# Patient Record
Sex: MALE | Race: WHITE | Employment: OTHER | URBAN - METROPOLITAN AREA
[De-identification: names, ages, dates, MRNs, and addresses within clinical notes are randomized per-mention and may not be internally consistent; named-entity substitution may affect disease eponyms.]

---

## 2017-01-01 ENCOUNTER — APPOINTMENT (OUTPATIENT)
Dept: GENERAL RADIOLOGY | Age: 72
DRG: 433 | End: 2017-01-01
Payer: MEDICARE

## 2017-01-01 ENCOUNTER — APPOINTMENT (OUTPATIENT)
Dept: ULTRASOUND IMAGING | Age: 72
DRG: 433 | End: 2017-01-01
Payer: MEDICARE

## 2017-01-01 ENCOUNTER — HOSPITAL ENCOUNTER (OUTPATIENT)
Dept: MRI IMAGING | Age: 72
Discharge: HOME | End: 2017-12-19
Payer: MEDICARE

## 2017-01-01 ENCOUNTER — APPOINTMENT (OUTPATIENT)
Dept: PHARMACY | Age: 72
End: 2017-01-01
Payer: MEDICARE

## 2017-01-01 ENCOUNTER — APPOINTMENT (OUTPATIENT)
Dept: CT IMAGING | Age: 72
DRG: 433 | End: 2017-01-01
Payer: MEDICARE

## 2017-01-01 ENCOUNTER — HOSPITAL ENCOUNTER (OUTPATIENT)
Dept: PHARMACY | Age: 72
Setting detail: THERAPIES SERIES
Discharge: HOME | End: 2017-10-27
Payer: MEDICARE

## 2017-01-01 ENCOUNTER — HOSPITAL ENCOUNTER (INPATIENT)
Age: 72
LOS: 3 days | Discharge: HOME | DRG: 433 | End: 2017-12-07
Attending: EMERGENCY MEDICINE | Admitting: INTERNAL MEDICINE
Payer: MEDICARE

## 2017-01-01 ENCOUNTER — HOSPITAL ENCOUNTER (OUTPATIENT)
Dept: CT IMAGING | Age: 72
Discharge: HOME | End: 2017-12-19
Payer: MEDICARE

## 2017-01-01 ENCOUNTER — HOSPITAL ENCOUNTER (OUTPATIENT)
Dept: GENERAL RADIOLOGY | Age: 72
Discharge: HOME | End: 2017-12-19
Payer: MEDICARE

## 2017-01-01 ENCOUNTER — OFFICE VISIT (OUTPATIENT)
Dept: GASTROENTEROLOGY | Age: 72
End: 2017-01-01

## 2017-01-01 ENCOUNTER — HOSPITAL ENCOUNTER (OUTPATIENT)
Dept: PHARMACY | Age: 72
Setting detail: THERAPIES SERIES
Discharge: HOME | End: 2017-12-15
Payer: MEDICARE

## 2017-01-01 VITALS
BODY MASS INDEX: 33.59 KG/M2 | DIASTOLIC BLOOD PRESSURE: 50 MMHG | WEIGHT: 214 LBS | OXYGEN SATURATION: 96 % | SYSTOLIC BLOOD PRESSURE: 110 MMHG | HEART RATE: 91 BPM | HEIGHT: 67 IN

## 2017-01-01 VITALS — HEART RATE: 64 BPM | SYSTOLIC BLOOD PRESSURE: 131 MMHG | DIASTOLIC BLOOD PRESSURE: 78 MMHG

## 2017-01-01 VITALS
RESPIRATION RATE: 15 BRPM | BODY MASS INDEX: 33.34 KG/M2 | DIASTOLIC BLOOD PRESSURE: 60 MMHG | HEART RATE: 73 BPM | WEIGHT: 220 LBS | HEIGHT: 68 IN | SYSTOLIC BLOOD PRESSURE: 95 MMHG | TEMPERATURE: 97.5 F | OXYGEN SATURATION: 97 %

## 2017-01-01 DIAGNOSIS — D61.818 PANCYTOPENIA (HCC): ICD-10-CM

## 2017-01-01 DIAGNOSIS — R16.0 LIVER MASS: ICD-10-CM

## 2017-01-01 DIAGNOSIS — I48.91 ATRIAL FIBRILLATION, UNSPECIFIED TYPE (HCC): ICD-10-CM

## 2017-01-01 DIAGNOSIS — K76.89 LIVER NODULE: Primary | ICD-10-CM

## 2017-01-01 DIAGNOSIS — R10.9 FLANK PAIN: ICD-10-CM

## 2017-01-01 DIAGNOSIS — K70.31 ASCITES DUE TO ALCOHOLIC CIRRHOSIS (HCC): ICD-10-CM

## 2017-01-01 DIAGNOSIS — R18.8 OTHER ASCITES: Primary | ICD-10-CM

## 2017-01-01 DIAGNOSIS — R07.9 CHEST PAIN, UNSPECIFIED TYPE: ICD-10-CM

## 2017-01-01 DIAGNOSIS — K74.69 OTHER CIRRHOSIS OF LIVER (HCC): ICD-10-CM

## 2017-01-01 DIAGNOSIS — R18.8 OTHER ASCITES: ICD-10-CM

## 2017-01-01 LAB
ABO/RH: NORMAL
ALBUMIN SERPL-MCNC: 3.3 G/DL (ref 3.9–4.9)
ALBUMIN SERPL-MCNC: 3.3 G/DL (ref 3.9–4.9)
ALBUMIN SERPL-MCNC: 3.6 G/DL
ALP BLD-CCNC: 43 U/L (ref 35–104)
ALP BLD-CCNC: 43 U/L (ref 35–104)
ALP BLD-CCNC: 68 U/L
ALT SERPL-CCNC: 8 U/L (ref 0–41)
ALT SERPL-CCNC: 8 U/L (ref 0–41)
ALT SERPL-CCNC: 9 U/L
AMMONIA: 93 UMOL/L (ref 16–60)
ANION GAP SERPL CALCULATED.3IONS-SCNC: 10 MEQ/L (ref 7–13)
ANION GAP SERPL CALCULATED.3IONS-SCNC: 14 MEQ/L (ref 7–13)
ANION GAP SERPL CALCULATED.3IONS-SCNC: 9 MMOL/L
ANISOCYTOSIS: ABNORMAL
ANTIBODY SCREEN: NORMAL
ANTITHROMBIN ACTIVITY: 52 % (ref 76–128)
ANTITHROMBIN ANTIGEN: 48 % (ref 82–136)
AST SERPL-CCNC: 22 U/L (ref 0–40)
AST SERPL-CCNC: 23 U/L (ref 0–40)
AST SERPL-CCNC: 26 U/L
BASOPHILS ABSOLUTE: 0 K/UL (ref 0–0.2)
BASOPHILS ABSOLUTE: ABNORMAL /ΜL
BASOPHILS RELATIVE PERCENT: 1.3 %
BASOPHILS RELATIVE PERCENT: ABNORMAL %
BILIRUB SERPL-MCNC: 1.8 MG/DL (ref 0–1.2)
BILIRUB SERPL-MCNC: 2 MG/DL (ref 0–1.2)
BILIRUB SERPL-MCNC: 2.8 MG/DL (ref 0.1–1.4)
BILIRUBIN URINE: ABNORMAL
BLOOD BANK DISPENSE STATUS: NORMAL
BLOOD BANK PRODUCT CODE: NORMAL
BLOOD CULTURE, ROUTINE: NORMAL
BLOOD, URINE: NEGATIVE
BPU ID: NORMAL
BUN BLDV-MCNC: 10 MG/DL (ref 8–23)
BUN BLDV-MCNC: 10 MG/DL (ref 8–23)
BUN BLDV-MCNC: ABNORMAL MG/DL
C-REACTIVE PROTEIN: 25.7 MG/L (ref 0–5)
CALCIUM SERPL-MCNC: 7.8 MG/DL (ref 8.6–10.2)
CALCIUM SERPL-MCNC: 8.1 MG/DL (ref 8.6–10.2)
CALCIUM SERPL-MCNC: 9 MG/DL
CASTS: NORMAL /LPF
CHLORIDE BLD-SCNC: 100 MEQ/L (ref 98–107)
CHLORIDE BLD-SCNC: 97 MMOL/L
CHLORIDE BLD-SCNC: 99 MEQ/L (ref 98–107)
CLARITY: CLEAR
CO2: 24 MEQ/L (ref 22–29)
CO2: 28 MEQ/L (ref 22–29)
CO2: ABNORMAL MMOL/L
COLOR: ABNORMAL
CREAT SERPL-MCNC: 0.46 MG/DL (ref 0.7–1.2)
CREAT SERPL-MCNC: 0.58 MG/DL (ref 0.7–1.2)
CREAT SERPL-MCNC: 0.67 MG/DL
CULTURE, BLOOD 2: NORMAL
DESCRIPTION BLOOD BANK: NORMAL
EKG ATRIAL RATE: 60 BPM
EKG Q-T INTERVAL: 408 MS
EKG QRS DURATION: 94 MS
EKG QTC CALCULATION (BAZETT): 459 MS
EKG R AXIS: 113 DEGREES
EKG T AXIS: 39 DEGREES
EKG VENTRICULAR RATE: 76 BPM
EOSINOPHILS ABSOLUTE: 0.1 K/UL (ref 0–0.7)
EOSINOPHILS ABSOLUTE: ABNORMAL /ΜL
EOSINOPHILS RELATIVE PERCENT: 3 %
EOSINOPHILS RELATIVE PERCENT: ABNORMAL %
EPITHELIAL CELLS, UA: NORMAL /HPF
GFR AFRICAN AMERICAN: >60
GFR AFRICAN AMERICAN: >60
GFR CALCULATED: >60
GFR NON-AFRICAN AMERICAN: >60
GFR NON-AFRICAN AMERICAN: >60
GLOBULIN: 2.3 G/DL (ref 2.3–3.5)
GLOBULIN: 2.4 G/DL (ref 2.3–3.5)
GLUCOSE BLD-MCNC: 100 MG/DL (ref 60–115)
GLUCOSE BLD-MCNC: 103 MG/DL (ref 60–115)
GLUCOSE BLD-MCNC: 110 MG/DL (ref 60–115)
GLUCOSE BLD-MCNC: 118 MG/DL (ref 60–115)
GLUCOSE BLD-MCNC: 119 MG/DL (ref 74–109)
GLUCOSE BLD-MCNC: 121 MG/DL
GLUCOSE BLD-MCNC: 136 MG/DL (ref 60–115)
GLUCOSE BLD-MCNC: 137 MG/DL (ref 60–115)
GLUCOSE BLD-MCNC: 171 MG/DL (ref 60–115)
GLUCOSE BLD-MCNC: 57 MG/DL (ref 60–115)
GLUCOSE BLD-MCNC: 69 MG/DL (ref 60–115)
GLUCOSE BLD-MCNC: 75 MG/DL (ref 60–115)
GLUCOSE BLD-MCNC: 84 MG/DL (ref 60–115)
GLUCOSE BLD-MCNC: 84 MG/DL (ref 74–109)
GLUCOSE BLD-MCNC: 91 MG/DL (ref 60–115)
GLUCOSE BLD-MCNC: 97 MG/DL (ref 60–115)
GLUCOSE URINE: NEGATIVE MG/DL
HCT VFR BLD CALC: 32.6 % (ref 42–52)
HCT VFR BLD CALC: 32.7 % (ref 42–52)
HCT VFR BLD CALC: 35.5 % (ref 41–53)
HEMOGLOBIN: 10.4 G/DL (ref 14–18)
HEMOGLOBIN: 10.5 G/DL (ref 14–18)
HEMOGLOBIN: 11.6 G/DL (ref 13.5–17.5)
HEPATITIS B SURFACE ANTIGEN INTERPRETATION: NORMAL
HEPATITIS C ANTIBODY INTERPRETATION: NORMAL
HEPATITIS C ANTIBODY INTERPRETATION: NORMAL
INR BLD: 2.7
INR BLD: 2.9
INR BLD: 3
INR BLD: 4.2
INR BLD: 6.9
KETONES, URINE: NEGATIVE MG/DL
LEUKOCYTE ESTERASE, URINE: ABNORMAL
LV EF: 53 %
LVEF MODALITY: NORMAL
LYMPHOCYTES ABSOLUTE: 0.5 K/UL (ref 1–4.8)
LYMPHOCYTES ABSOLUTE: ABNORMAL /ΜL
LYMPHOCYTES RELATIVE PERCENT: 14 %
LYMPHOCYTES RELATIVE PERCENT: ABNORMAL %
MCH RBC QN AUTO: 28.2 PG (ref 27–31.3)
MCH RBC QN AUTO: 28.4 PG (ref 27–31.3)
MCH RBC QN AUTO: 28.7 PG
MCHC RBC AUTO-ENTMCNC: 31.9 % (ref 33–37)
MCHC RBC AUTO-ENTMCNC: 32.2 % (ref 33–37)
MCHC RBC AUTO-ENTMCNC: 32.7 G/DL
MCV RBC AUTO: 87.9 FL
MCV RBC AUTO: 88.2 FL (ref 80–100)
MCV RBC AUTO: 88.3 FL (ref 80–100)
MICROCYTES: 0
MITOCHONDRIAL M2 AB, IGG: 8.3 UNITS (ref 0–20)
MONOCYTES ABSOLUTE: 0.1 K/UL (ref 0.2–0.8)
MONOCYTES ABSOLUTE: ABNORMAL /ΜL
MONOCYTES RELATIVE PERCENT: 2.9 %
MONOCYTES RELATIVE PERCENT: ABNORMAL %
MUCUS: PRESENT
NEUTROPHILS ABSOLUTE: 3 K/UL (ref 1.4–6.5)
NEUTROPHILS ABSOLUTE: ABNORMAL /ΜL
NEUTROPHILS RELATIVE PERCENT: 80 %
NEUTROPHILS RELATIVE PERCENT: ABNORMAL %
NITRITE, URINE: POSITIVE
OVALOCYTES: ABNORMAL
PDW BLD-RTO: 19 % (ref 11.5–14.5)
PDW BLD-RTO: 19.1 % (ref 11.5–14.5)
PERFORMED ON: ABNORMAL
PERFORMED ON: NORMAL
PH UA: 5.5 (ref 5–9)
PLATELET # BLD: 123 K/UL (ref 130–400)
PLATELET # BLD: 133 K/UL (ref 130–400)
PLATELET # BLD: 154 K/ΜL
PLATELET SLIDE REVIEW: ADEQUATE
PMV BLD AUTO: 10.1 FL
POIKILOCYTES: ABNORMAL
POLYCHROMASIA: ABNORMAL
POTASSIUM SERPL-SCNC: 4.2 MEQ/L (ref 3.5–5.1)
POTASSIUM SERPL-SCNC: 4.4 MMOL/L
POTASSIUM SERPL-SCNC: 4.5 MEQ/L (ref 3.5–5.1)
PROTEIN C FUNCTIONAL: 12 % (ref 83–168)
PROTEIN S, FUNCTIONAL: 55 % (ref 66–143)
PROTEIN UA: 30 MG/DL
PROTHROMBIN TIME: 32 SEC (ref 8.1–13.7)
PROTHROMBIN TIME: 73.4 SEC (ref 8.1–13.7)
PROTIME: 32.8 SECONDS
PROTIME: 35.1 SECONDS
PROTIME: 41.3 SECONDS
RBC # BLD: 3.69 M/UL (ref 4.7–6.1)
RBC # BLD: 3.71 M/UL (ref 4.7–6.1)
RBC # BLD: 4.04 10^6/ΜL
RBC UA: NORMAL /HPF (ref 0–2)
SMUDGE CELLS: 1
SODIUM BLD-SCNC: 133 MMOL/L
SODIUM BLD-SCNC: 137 MEQ/L (ref 132–144)
SODIUM BLD-SCNC: 138 MEQ/L (ref 132–144)
SPECIFIC GRAVITY UA: 1.03 (ref 1–1.03)
TOTAL PROTEIN: 5.6 G/DL (ref 6.4–8.1)
TOTAL PROTEIN: 5.7 G/DL (ref 6.4–8.1)
TOTAL PROTEIN: 6.4
URINE CULTURE, ROUTINE: NORMAL
URINE CULTURE, ROUTINE: NORMAL
URINE REFLEX TO CULTURE: YES
UROBILINOGEN, URINE: 2 E.U./DL
WBC # BLD: 3.7 K/UL (ref 4.8–10.8)
WBC # BLD: 3.9 K/UL (ref 4.8–10.8)
WBC # BLD: 4.5 10^3/ML
WBC UA: NORMAL /HPF (ref 0–5)

## 2017-01-01 PROCEDURE — 6360000004 HC RX CONTRAST MEDICATION: Performed by: INTERNAL MEDICINE

## 2017-01-01 PROCEDURE — 74177 CT ABD & PELVIS W/CONTRAST: CPT

## 2017-01-01 PROCEDURE — 6370000000 HC RX 637 (ALT 250 FOR IP): Performed by: INTERNAL MEDICINE

## 2017-01-01 PROCEDURE — 94664 DEMO&/EVAL PT USE INHALER: CPT

## 2017-01-01 PROCEDURE — G8427 DOCREV CUR MEDS BY ELIG CLIN: HCPCS | Performed by: PHYSICIAN ASSISTANT

## 2017-01-01 PROCEDURE — 71010 XR CHEST PORTABLE: CPT

## 2017-01-01 PROCEDURE — 85610 PROTHROMBIN TIME: CPT

## 2017-01-01 PROCEDURE — 1123F ACP DISCUSS/DSCN MKR DOCD: CPT | Performed by: PHYSICIAN ASSISTANT

## 2017-01-01 PROCEDURE — 71020 XR CHEST STANDARD TWO VW: CPT

## 2017-01-01 PROCEDURE — 93306 TTE W/DOPPLER COMPLETE: CPT

## 2017-01-01 PROCEDURE — 72100 X-RAY EXAM L-S SPINE 2/3 VWS: CPT

## 2017-01-01 PROCEDURE — 6360000002 HC RX W HCPCS: Performed by: INTERNAL MEDICINE

## 2017-01-01 PROCEDURE — 81001 URINALYSIS AUTO W/SCOPE: CPT

## 2017-01-01 PROCEDURE — 80053 COMPREHEN METABOLIC PANEL: CPT

## 2017-01-01 PROCEDURE — 99223 1ST HOSP IP/OBS HIGH 75: CPT | Performed by: INTERNAL MEDICINE

## 2017-01-01 PROCEDURE — 1210000000 HC MED SURG R&B

## 2017-01-01 PROCEDURE — 3017F COLORECTAL CA SCREEN DOC REV: CPT | Performed by: PHYSICIAN ASSISTANT

## 2017-01-01 PROCEDURE — 6360000002 HC RX W HCPCS: Performed by: EMERGENCY MEDICINE

## 2017-01-01 PROCEDURE — 74176 CT ABD & PELVIS W/O CONTRAST: CPT

## 2017-01-01 PROCEDURE — 36415 COLL VENOUS BLD VENIPUNCTURE: CPT

## 2017-01-01 PROCEDURE — 85300 ANTITHROMBIN III ACTIVITY: CPT

## 2017-01-01 PROCEDURE — 1111F DSCHRG MED/CURRENT MED MERGE: CPT | Performed by: PHYSICIAN ASSISTANT

## 2017-01-01 PROCEDURE — 76705 ECHO EXAM OF ABDOMEN: CPT

## 2017-01-01 PROCEDURE — 83516 IMMUNOASSAY NONANTIBODY: CPT

## 2017-01-01 PROCEDURE — G8417 CALC BMI ABV UP PARAM F/U: HCPCS | Performed by: PHYSICIAN ASSISTANT

## 2017-01-01 PROCEDURE — 87040 BLOOD CULTURE FOR BACTERIA: CPT

## 2017-01-01 PROCEDURE — 99211 OFF/OP EST MAY X REQ PHY/QHP: CPT | Performed by: PHARMACIST

## 2017-01-01 PROCEDURE — 99211 OFF/OP EST MAY X REQ PHY/QHP: CPT

## 2017-01-01 PROCEDURE — 4040F PNEUMOC VAC/ADMIN/RCVD: CPT | Performed by: PHYSICIAN ASSISTANT

## 2017-01-01 PROCEDURE — 85027 COMPLETE CBC AUTOMATED: CPT

## 2017-01-01 PROCEDURE — 99285 EMERGENCY DEPT VISIT HI MDM: CPT

## 2017-01-01 PROCEDURE — 2580000003 HC RX 258: Performed by: EMERGENCY MEDICINE

## 2017-01-01 PROCEDURE — P9017 PLASMA 1 DONOR FRZ W/IN 8 HR: HCPCS

## 2017-01-01 PROCEDURE — 87340 HEPATITIS B SURFACE AG IA: CPT

## 2017-01-01 PROCEDURE — A9577 INJ MULTIHANCE: HCPCS | Performed by: INTERNAL MEDICINE

## 2017-01-01 PROCEDURE — 87086 URINE CULTURE/COLONY COUNT: CPT

## 2017-01-01 PROCEDURE — 82140 ASSAY OF AMMONIA: CPT

## 2017-01-01 PROCEDURE — 74183 MRI ABD W/O CNTR FLWD CNTR: CPT

## 2017-01-01 PROCEDURE — G8484 FLU IMMUNIZE NO ADMIN: HCPCS | Performed by: PHYSICIAN ASSISTANT

## 2017-01-01 PROCEDURE — 86850 RBC ANTIBODY SCREEN: CPT

## 2017-01-01 PROCEDURE — 86803 HEPATITIS C AB TEST: CPT

## 2017-01-01 PROCEDURE — 85301 ANTITHROMBIN III ANTIGEN: CPT

## 2017-01-01 PROCEDURE — 36430 TRANSFUSION BLD/BLD COMPNT: CPT

## 2017-01-01 PROCEDURE — 85306 CLOT INHIBIT PROT S FREE: CPT

## 2017-01-01 PROCEDURE — 86900 BLOOD TYPING SEROLOGIC ABO: CPT

## 2017-01-01 PROCEDURE — 1036F TOBACCO NON-USER: CPT | Performed by: PHYSICIAN ASSISTANT

## 2017-01-01 PROCEDURE — 96374 THER/PROPH/DIAG INJ IV PUSH: CPT

## 2017-01-01 PROCEDURE — 85610 PROTHROMBIN TIME: CPT | Performed by: PHARMACIST

## 2017-01-01 PROCEDURE — 85025 COMPLETE CBC W/AUTO DIFF WBC: CPT

## 2017-01-01 PROCEDURE — 2580000003 HC RX 258: Performed by: INTERNAL MEDICINE

## 2017-01-01 PROCEDURE — 86140 C-REACTIVE PROTEIN: CPT

## 2017-01-01 PROCEDURE — 86901 BLOOD TYPING SEROLOGIC RH(D): CPT

## 2017-01-01 PROCEDURE — 99214 OFFICE O/P EST MOD 30 MIN: CPT | Performed by: PHYSICIAN ASSISTANT

## 2017-01-01 PROCEDURE — 93005 ELECTROCARDIOGRAM TRACING: CPT

## 2017-01-01 PROCEDURE — 85303 CLOT INHIBIT PROT C ACTIVITY: CPT

## 2017-01-01 RX ORDER — NEBIVOLOL 5 MG/1
10 TABLET ORAL DAILY
Status: DISCONTINUED | OUTPATIENT
Start: 2017-01-01 | End: 2017-01-01 | Stop reason: HOSPADM

## 2017-01-01 RX ORDER — CIPROFLOXACIN 500 MG/1
500 TABLET, FILM COATED ORAL 2 TIMES DAILY
Status: DISCONTINUED | OUTPATIENT
Start: 2017-01-01 | End: 2017-01-01

## 2017-01-01 RX ORDER — KETOROLAC TROMETHAMINE 30 MG/ML
30 INJECTION, SOLUTION INTRAMUSCULAR; INTRAVENOUS ONCE
Status: COMPLETED | OUTPATIENT
Start: 2017-01-01 | End: 2017-01-01

## 2017-01-01 RX ORDER — DEXTROSE MONOHYDRATE 25 G/50ML
12.5 INJECTION, SOLUTION INTRAVENOUS PRN
Status: DISCONTINUED | OUTPATIENT
Start: 2017-01-01 | End: 2017-01-01 | Stop reason: HOSPADM

## 2017-01-01 RX ORDER — AMOXICILLIN 250 MG/1
500 CAPSULE ORAL 3 TIMES DAILY
Qty: 10 CAPSULE | Refills: 0 | OUTPATIENT
Start: 2017-01-01 | End: 2017-01-01

## 2017-01-01 RX ORDER — TAMSULOSIN HYDROCHLORIDE 0.4 MG/1
0.4 CAPSULE ORAL
Status: DISCONTINUED | OUTPATIENT
Start: 2017-01-01 | End: 2017-01-01 | Stop reason: HOSPADM

## 2017-01-01 RX ORDER — BUMETANIDE 1 MG/1
1 TABLET ORAL
Status: DISCONTINUED | OUTPATIENT
Start: 2017-01-01 | End: 2017-01-01 | Stop reason: HOSPADM

## 2017-01-01 RX ORDER — ALBUTEROL SULFATE 90 UG/1
2 AEROSOL, METERED RESPIRATORY (INHALATION) EVERY 4 HOURS PRN
Status: DISCONTINUED | OUTPATIENT
Start: 2017-01-01 | End: 2017-01-01 | Stop reason: HOSPADM

## 2017-01-01 RX ORDER — ASPIRIN 81 MG/1
81 TABLET, CHEWABLE ORAL DAILY
Status: DISCONTINUED | OUTPATIENT
Start: 2017-01-01 | End: 2017-01-01 | Stop reason: HOSPADM

## 2017-01-01 RX ORDER — AMLODIPINE BESYLATE 5 MG/1
5 TABLET ORAL DAILY
Status: DISCONTINUED | OUTPATIENT
Start: 2017-01-01 | End: 2017-01-01 | Stop reason: HOSPADM

## 2017-01-01 RX ORDER — SPIRONOLACTONE 25 MG/1
25 TABLET ORAL DAILY
Refills: 2 | COMMUNITY
Start: 2017-01-01

## 2017-01-01 RX ORDER — DEXTROSE MONOHYDRATE 50 MG/ML
100 INJECTION, SOLUTION INTRAVENOUS PRN
Status: DISCONTINUED | OUTPATIENT
Start: 2017-01-01 | End: 2017-01-01 | Stop reason: HOSPADM

## 2017-01-01 RX ORDER — SIMVASTATIN 40 MG
40 TABLET ORAL NIGHTLY
Status: DISCONTINUED | OUTPATIENT
Start: 2017-01-01 | End: 2017-01-01 | Stop reason: HOSPADM

## 2017-01-01 RX ORDER — HYDROCODONE BITARTRATE AND ACETAMINOPHEN 5; 325 MG/1; MG/1
1 TABLET ORAL EVERY 4 HOURS PRN
Status: DISCONTINUED | OUTPATIENT
Start: 2017-01-01 | End: 2017-01-01 | Stop reason: HOSPADM

## 2017-01-01 RX ORDER — TRAZODONE HYDROCHLORIDE 150 MG/1
150 TABLET ORAL NIGHTLY
Status: DISCONTINUED | OUTPATIENT
Start: 2017-01-01 | End: 2017-01-01 | Stop reason: HOSPADM

## 2017-01-01 RX ORDER — GLIPIZIDE 5 MG/1
5 TABLET ORAL
Status: DISCONTINUED | OUTPATIENT
Start: 2017-01-01 | End: 2017-01-01 | Stop reason: HOSPADM

## 2017-01-01 RX ORDER — SODIUM CHLORIDE 0.9 % (FLUSH) 0.9 %
10 SYRINGE (ML) INJECTION PRN
Status: DISCONTINUED | OUTPATIENT
Start: 2017-01-01 | End: 2017-01-01 | Stop reason: HOSPADM

## 2017-01-01 RX ORDER — SODIUM CHLORIDE 0.9 % (FLUSH) 0.9 %
10 SYRINGE (ML) INJECTION
Status: DISPENSED | OUTPATIENT
Start: 2017-01-01 | End: 2017-01-01

## 2017-01-01 RX ORDER — SODIUM CHLORIDE 9 MG/ML
INJECTION, SOLUTION INTRAVENOUS CONTINUOUS
Status: DISCONTINUED | OUTPATIENT
Start: 2017-01-01 | End: 2017-01-01

## 2017-01-01 RX ORDER — FENTANYL CITRATE 50 UG/ML
50 INJECTION, SOLUTION INTRAMUSCULAR; INTRAVENOUS ONCE
Status: COMPLETED | OUTPATIENT
Start: 2017-01-01 | End: 2017-01-01

## 2017-01-01 RX ORDER — NICOTINE POLACRILEX 4 MG
15 LOZENGE BUCCAL PRN
Status: DISCONTINUED | OUTPATIENT
Start: 2017-01-01 | End: 2017-01-01 | Stop reason: HOSPADM

## 2017-01-01 RX ORDER — AMOXICILLIN 500 MG/1
500 CAPSULE ORAL EVERY 12 HOURS SCHEDULED
Status: DISCONTINUED | OUTPATIENT
Start: 2017-01-01 | End: 2017-01-01 | Stop reason: HOSPADM

## 2017-01-01 RX ORDER — SODIUM CHLORIDE 0.9 % (FLUSH) 0.9 %
10 SYRINGE (ML) INJECTION EVERY 12 HOURS SCHEDULED
Status: DISCONTINUED | OUTPATIENT
Start: 2017-01-01 | End: 2017-01-01 | Stop reason: HOSPADM

## 2017-01-01 RX ORDER — ACETAMINOPHEN 325 MG/1
650 TABLET ORAL EVERY 4 HOURS PRN
Status: DISCONTINUED | OUTPATIENT
Start: 2017-01-01 | End: 2017-01-01 | Stop reason: HOSPADM

## 2017-01-01 RX ORDER — PHYTONADIONE 10 MG/ML
5 INJECTION, EMULSION INTRAMUSCULAR; INTRAVENOUS; SUBCUTANEOUS ONCE
Status: COMPLETED | OUTPATIENT
Start: 2017-01-01 | End: 2017-01-01

## 2017-01-01 RX ORDER — ALBUTEROL SULFATE 90 UG/1
2 AEROSOL, METERED RESPIRATORY (INHALATION) PRN
Status: DISCONTINUED | OUTPATIENT
Start: 2017-01-01 | End: 2017-01-01

## 2017-01-01 RX ORDER — SODIUM CHLORIDE 0.9 % (FLUSH) 0.9 %
40 SYRINGE (ML) INJECTION PRN
Status: DISCONTINUED | OUTPATIENT
Start: 2017-01-01 | End: 2017-01-01 | Stop reason: HOSPADM

## 2017-01-01 RX ORDER — FENOFIBRATE 160 MG/1
160 TABLET ORAL
Status: DISCONTINUED | OUTPATIENT
Start: 2017-01-01 | End: 2017-01-01

## 2017-01-01 RX ORDER — 0.9 % SODIUM CHLORIDE 0.9 %
250 INTRAVENOUS SOLUTION INTRAVENOUS ONCE
Status: COMPLETED | OUTPATIENT
Start: 2017-01-01 | End: 2017-01-01

## 2017-01-01 RX ORDER — SIMVASTATIN 40 MG
40 TABLET ORAL NIGHTLY
Status: ON HOLD | COMMUNITY
End: 2017-01-01 | Stop reason: HOSPADM

## 2017-01-01 RX ORDER — ALBUTEROL SULFATE 90 UG/1
2 AEROSOL, METERED RESPIRATORY (INHALATION) PRN
COMMUNITY
Start: 2012-11-21

## 2017-01-01 RX ADMIN — ASPIRIN 81 MG 81 MG: 81 TABLET ORAL at 10:23

## 2017-01-01 RX ADMIN — TRAZODONE HYDROCHLORIDE 150 MG: 150 TABLET ORAL at 20:24

## 2017-01-01 RX ADMIN — METFORMIN HYDROCHLORIDE 1000 MG: 500 TABLET ORAL at 10:23

## 2017-01-01 RX ADMIN — HYDROCODONE BITARTRATE AND ACETAMINOPHEN 1 TABLET: 5; 325 TABLET ORAL at 05:46

## 2017-01-01 RX ADMIN — BUMETANIDE 1 MG: 1 TABLET ORAL at 16:52

## 2017-01-01 RX ADMIN — SODIUM CHLORIDE, PRESERVATIVE FREE 10 ML: 5 INJECTION INTRAVENOUS at 21:42

## 2017-01-01 RX ADMIN — HYDROCODONE BITARTRATE AND ACETAMINOPHEN 1 TABLET: 5; 325 TABLET ORAL at 17:03

## 2017-01-01 RX ADMIN — HYDROCODONE BITARTRATE AND ACETAMINOPHEN 1 TABLET: 5; 325 TABLET ORAL at 00:21

## 2017-01-01 RX ADMIN — CIPROFLOXACIN HYDROCHLORIDE 500 MG: 500 TABLET, FILM COATED ORAL at 17:43

## 2017-01-01 RX ADMIN — NEBIVOLOL HYDROCHLORIDE 10 MG: 5 TABLET ORAL at 08:43

## 2017-01-01 RX ADMIN — AMLODIPINE BESYLATE 5 MG: 5 TABLET ORAL at 08:43

## 2017-01-01 RX ADMIN — GLIPIZIDE 5 MG: 5 TABLET ORAL at 16:52

## 2017-01-01 RX ADMIN — CIPROFLOXACIN HYDROCHLORIDE 500 MG: 500 TABLET, FILM COATED ORAL at 09:40

## 2017-01-01 RX ADMIN — FENTANYL CITRATE 50 MCG: 50 INJECTION, SOLUTION INTRAMUSCULAR; INTRAVENOUS at 15:29

## 2017-01-01 RX ADMIN — SIMVASTATIN 40 MG: 40 TABLET, FILM COATED ORAL at 21:41

## 2017-01-01 RX ADMIN — ENOXAPARIN SODIUM 40 MG: 40 INJECTION, SOLUTION INTRAVENOUS; SUBCUTANEOUS at 17:30

## 2017-01-01 RX ADMIN — BUMETANIDE 1 MG: 1 TABLET ORAL at 21:30

## 2017-01-01 RX ADMIN — TAMSULOSIN HYDROCHLORIDE 0.4 MG: 0.4 CAPSULE ORAL at 17:44

## 2017-01-01 RX ADMIN — SODIUM CHLORIDE 250 ML: 9 INJECTION, SOLUTION INTRAVENOUS at 15:05

## 2017-01-01 RX ADMIN — NEBIVOLOL HYDROCHLORIDE 10 MG: 5 TABLET ORAL at 09:40

## 2017-01-01 RX ADMIN — HYDROCODONE BITARTRATE AND ACETAMINOPHEN 1 TABLET: 5; 325 TABLET ORAL at 05:12

## 2017-01-01 RX ADMIN — ASPIRIN 81 MG 81 MG: 81 TABLET ORAL at 09:41

## 2017-01-01 RX ADMIN — SIMVASTATIN 40 MG: 40 TABLET, FILM COATED ORAL at 21:31

## 2017-01-01 RX ADMIN — SODIUM CHLORIDE: 9 INJECTION, SOLUTION INTRAVENOUS at 17:30

## 2017-01-01 RX ADMIN — LINAGLIPTIN 5 MG: 5 TABLET, FILM COATED ORAL at 09:41

## 2017-01-01 RX ADMIN — METFORMIN HYDROCHLORIDE 1000 MG: 500 TABLET ORAL at 09:41

## 2017-01-01 RX ADMIN — SIMVASTATIN 40 MG: 40 TABLET, FILM COATED ORAL at 20:24

## 2017-01-01 RX ADMIN — METFORMIN HYDROCHLORIDE 1000 MG: 500 TABLET ORAL at 16:52

## 2017-01-01 RX ADMIN — IOPAMIDOL 100 ML: 612 INJECTION, SOLUTION INTRAVENOUS at 11:30

## 2017-01-01 RX ADMIN — SODIUM CHLORIDE, PRESERVATIVE FREE 10 ML: 5 INJECTION INTRAVENOUS at 09:41

## 2017-01-01 RX ADMIN — METFORMIN HYDROCHLORIDE 1000 MG: 500 TABLET ORAL at 17:43

## 2017-01-01 RX ADMIN — GLIPIZIDE 5 MG: 5 TABLET ORAL at 17:44

## 2017-01-01 RX ADMIN — ACETAMINOPHEN 650 MG: 325 TABLET, FILM COATED ORAL at 18:17

## 2017-01-01 RX ADMIN — KETOROLAC TROMETHAMINE 30 MG: 30 INJECTION, SOLUTION INTRAMUSCULAR at 13:17

## 2017-01-01 RX ADMIN — NEBIVOLOL HYDROCHLORIDE 10 MG: 5 TABLET ORAL at 10:23

## 2017-01-01 RX ADMIN — METFORMIN HYDROCHLORIDE 1000 MG: 500 TABLET ORAL at 08:43

## 2017-01-01 RX ADMIN — SODIUM CHLORIDE: 9 INJECTION, SOLUTION INTRAVENOUS at 04:14

## 2017-01-01 RX ADMIN — LINAGLIPTIN 5 MG: 5 TABLET, FILM COATED ORAL at 08:43

## 2017-01-01 RX ADMIN — HYDROCODONE BITARTRATE AND ACETAMINOPHEN 1 TABLET: 5; 325 TABLET ORAL at 21:41

## 2017-01-01 RX ADMIN — TRAZODONE HYDROCHLORIDE 150 MG: 150 TABLET ORAL at 21:41

## 2017-01-01 RX ADMIN — ASPIRIN 81 MG 81 MG: 81 TABLET ORAL at 08:43

## 2017-01-01 RX ADMIN — TAMSULOSIN HYDROCHLORIDE 0.4 MG: 0.4 CAPSULE ORAL at 16:52

## 2017-01-01 RX ADMIN — SODIUM CHLORIDE, PRESERVATIVE FREE 10 ML: 5 INJECTION INTRAVENOUS at 10:26

## 2017-01-01 RX ADMIN — AMLODIPINE BESYLATE 5 MG: 5 TABLET ORAL at 09:40

## 2017-01-01 RX ADMIN — GADOBENATE DIMEGLUMINE 21 ML: 529 INJECTION, SOLUTION INTRAVENOUS at 14:45

## 2017-01-01 RX ADMIN — PHYTONADIONE 5 MG: 10 INJECTION, EMULSION INTRAMUSCULAR; INTRAVENOUS; SUBCUTANEOUS at 08:43

## 2017-01-01 RX ADMIN — SODIUM CHLORIDE, PRESERVATIVE FREE 10 ML: 5 INJECTION INTRAVENOUS at 20:25

## 2017-01-01 RX ADMIN — LINAGLIPTIN 5 MG: 5 TABLET, FILM COATED ORAL at 10:23

## 2017-01-01 RX ADMIN — BUMETANIDE 1 MG: 1 TABLET ORAL at 17:43

## 2017-01-01 RX ADMIN — CIPROFLOXACIN HYDROCHLORIDE 500 MG: 500 TABLET, FILM COATED ORAL at 10:23

## 2017-01-01 RX ADMIN — SODIUM CHLORIDE: 9 INJECTION, SOLUTION INTRAVENOUS at 13:17

## 2017-01-01 RX ADMIN — ACETAMINOPHEN 650 MG: 325 TABLET, FILM COATED ORAL at 08:42

## 2017-01-01 RX ADMIN — TAMSULOSIN HYDROCHLORIDE 0.4 MG: 0.4 CAPSULE ORAL at 21:31

## 2017-01-01 RX ADMIN — TRAZODONE HYDROCHLORIDE 150 MG: 150 TABLET ORAL at 21:31

## 2017-01-01 RX ADMIN — CIPROFLOXACIN HYDROCHLORIDE 500 MG: 500 TABLET, FILM COATED ORAL at 20:24

## 2017-01-01 RX ADMIN — AMLODIPINE BESYLATE 5 MG: 5 TABLET ORAL at 10:23

## 2017-01-01 ASSESSMENT — ENCOUNTER SYMPTOMS
COLOR CHANGE: 0
ANAL BLEEDING: 0
APNEA: 0
NAUSEA: 0
VOMITING: 0
ABDOMINAL PAIN: 0
FACIAL SWELLING: 0
WHEEZING: 0
COUGH: 0
VOMITING: 0
COLOR CHANGE: 0
NAUSEA: 0
COUGH: 0
EYE DISCHARGE: 0
SHORTNESS OF BREATH: 0
ABDOMINAL PAIN: 0
BLOOD IN STOOL: 0
VOMITING: 0
SHORTNESS OF BREATH: 0
RECTAL PAIN: 0
ABDOMINAL DISTENTION: 1
BACK PAIN: 1
RHINORRHEA: 0
CHEST TIGHTNESS: 0
DIARRHEA: 0
DIARRHEA: 0
CONSTIPATION: 1
SHORTNESS OF BREATH: 0

## 2017-01-01 ASSESSMENT — PAIN SCALES - GENERAL
PAINLEVEL_OUTOF10: 4
PAINLEVEL_OUTOF10: 2
PAINLEVEL_OUTOF10: 10
PAINLEVEL_OUTOF10: 8
PAINLEVEL_OUTOF10: 0
PAINLEVEL_OUTOF10: 6
PAINLEVEL_OUTOF10: 6
PAINLEVEL_OUTOF10: 4
PAINLEVEL_OUTOF10: 6
PAINLEVEL_OUTOF10: 5
PAINLEVEL_OUTOF10: 1
PAINLEVEL_OUTOF10: 8
PAINLEVEL_OUTOF10: 7
PAINLEVEL_OUTOF10: 2

## 2017-01-01 ASSESSMENT — PAIN DESCRIPTION - PAIN TYPE: TYPE: ACUTE PAIN

## 2017-01-01 ASSESSMENT — PAIN DESCRIPTION - LOCATION
LOCATION: BACK
LOCATION: BACK

## 2017-01-01 ASSESSMENT — PAIN DESCRIPTION - ORIENTATION: ORIENTATION: LOWER;RIGHT

## 2017-01-01 ASSESSMENT — PAIN DESCRIPTION - DESCRIPTORS
DESCRIPTORS: ACHING
DESCRIPTORS: ACHING

## 2017-01-20 ENCOUNTER — HOSPITAL ENCOUNTER (OUTPATIENT)
Dept: PHARMACY | Age: 72
Discharge: HOME | End: 2017-01-20
Payer: MEDICARE

## 2017-01-20 DIAGNOSIS — I48.91 ATRIAL FIBRILLATION, UNSPECIFIED TYPE (HCC): ICD-10-CM

## 2017-01-20 LAB
INR BLD: 2.9
PROTIME: 34.2 SECONDS

## 2017-01-20 PROCEDURE — G0463 HOSPITAL OUTPT CLINIC VISIT: HCPCS

## 2017-01-20 PROCEDURE — 85610 PROTHROMBIN TIME: CPT

## 2017-02-17 ENCOUNTER — HOSPITAL ENCOUNTER (OUTPATIENT)
Dept: PHARMACY | Age: 72
Discharge: HOME | End: 2017-02-17
Payer: MEDICARE

## 2017-02-17 DIAGNOSIS — I48.91 ATRIAL FIBRILLATION, UNSPECIFIED TYPE (HCC): ICD-10-CM

## 2017-02-17 LAB
INR BLD: 3
PROTIME: 36.3 SECONDS

## 2017-02-17 PROCEDURE — G0463 HOSPITAL OUTPT CLINIC VISIT: HCPCS

## 2017-02-17 PROCEDURE — 85610 PROTHROMBIN TIME: CPT

## 2017-03-29 ENCOUNTER — HOSPITAL ENCOUNTER (OUTPATIENT)
Dept: PHARMACY | Age: 72
Discharge: HOME | End: 2017-03-29
Payer: MEDICARE

## 2017-03-29 DIAGNOSIS — I48.91 ATRIAL FIBRILLATION, UNSPECIFIED TYPE (HCC): ICD-10-CM

## 2017-03-29 LAB
INR BLD: 3.2
PROTIME: 38.7 SECONDS

## 2017-03-29 PROCEDURE — G0463 HOSPITAL OUTPT CLINIC VISIT: HCPCS | Performed by: PHARMACIST

## 2017-03-29 PROCEDURE — 85610 PROTHROMBIN TIME: CPT | Performed by: PHARMACIST

## 2017-04-21 ENCOUNTER — HOSPITAL ENCOUNTER (OUTPATIENT)
Dept: PHARMACY | Age: 72
Discharge: HOME | End: 2017-04-21
Payer: MEDICARE

## 2017-04-21 DIAGNOSIS — I48.91 ATRIAL FIBRILLATION, UNSPECIFIED TYPE (HCC): ICD-10-CM

## 2017-04-21 LAB
INR BLD: 3.6
PROTIME: 43.5 SECONDS

## 2017-04-21 PROCEDURE — 85610 PROTHROMBIN TIME: CPT

## 2017-04-21 PROCEDURE — G0463 HOSPITAL OUTPT CLINIC VISIT: HCPCS

## 2017-04-24 RX ORDER — SODIUM CHLORIDE 0.9 % (FLUSH) 0.9 %
10 SYRINGE (ML) INJECTION PRN
Status: CANCELLED | OUTPATIENT
Start: 2017-04-24

## 2017-04-24 RX ORDER — SODIUM CHLORIDE 0.9 % (FLUSH) 0.9 %
10 SYRINGE (ML) INJECTION EVERY 12 HOURS SCHEDULED
Status: CANCELLED | OUTPATIENT
Start: 2017-04-24

## 2017-04-24 RX ORDER — WARFARIN SODIUM 5 MG/1
TABLET ORAL
Qty: 60 TABLET | Refills: 1 | Status: SHIPPED | OUTPATIENT
Start: 2017-04-24 | End: 2018-01-01 | Stop reason: SDUPTHER

## 2017-04-25 ENCOUNTER — HOSPITAL ENCOUNTER (OUTPATIENT)
Age: 72
Setting detail: OUTPATIENT SURGERY
Discharge: HOME | End: 2017-04-25
Attending: INTERNAL MEDICINE | Admitting: INTERNAL MEDICINE
Payer: MEDICARE

## 2017-04-25 ENCOUNTER — ANESTHESIA EVENT (OUTPATIENT)
Dept: OPERATING ROOM | Age: 72
End: 2017-04-25
Payer: MEDICARE

## 2017-04-25 ENCOUNTER — ANESTHESIA (OUTPATIENT)
Dept: OPERATING ROOM | Age: 72
End: 2017-04-25
Payer: MEDICARE

## 2017-04-25 VITALS
RESPIRATION RATE: 16 BRPM | BODY MASS INDEX: 33.34 KG/M2 | HEART RATE: 84 BPM | WEIGHT: 220 LBS | SYSTOLIC BLOOD PRESSURE: 132 MMHG | OXYGEN SATURATION: 96 % | TEMPERATURE: 98.6 F | DIASTOLIC BLOOD PRESSURE: 60 MMHG | HEIGHT: 68 IN

## 2017-04-25 VITALS
RESPIRATION RATE: 21 BRPM | DIASTOLIC BLOOD PRESSURE: 65 MMHG | SYSTOLIC BLOOD PRESSURE: 132 MMHG | OXYGEN SATURATION: 87 %

## 2017-04-25 PROBLEM — R13.10 DYSPHAGIA: Status: ACTIVE | Noted: 2017-04-25

## 2017-04-25 LAB
GLUCOSE BLD-MCNC: 241 MG/DL (ref 60–115)
PERFORMED ON: ABNORMAL

## 2017-04-25 PROCEDURE — 2580000003 HC RX 258

## 2017-04-25 PROCEDURE — 3700000001 HC ADD 15 MINUTES (ANESTHESIA): Performed by: INTERNAL MEDICINE

## 2017-04-25 PROCEDURE — 3609012400 HC EGD TRANSORAL BIOPSY SINGLE/MULTIPLE: Performed by: INTERNAL MEDICINE

## 2017-04-25 PROCEDURE — 7100000011 HC PHASE II RECOVERY - ADDTL 15 MIN: Performed by: INTERNAL MEDICINE

## 2017-04-25 PROCEDURE — 3700000000 HC ANESTHESIA ATTENDED CARE: Performed by: INTERNAL MEDICINE

## 2017-04-25 PROCEDURE — 7100000010 HC PHASE II RECOVERY - FIRST 15 MIN: Performed by: INTERNAL MEDICINE

## 2017-04-25 PROCEDURE — 6360000002 HC RX W HCPCS: Performed by: NURSE ANESTHETIST, CERTIFIED REGISTERED

## 2017-04-25 PROCEDURE — 2500000003 HC RX 250 WO HCPCS

## 2017-04-25 PROCEDURE — 2580000003 HC RX 258: Performed by: INTERNAL MEDICINE

## 2017-04-25 PROCEDURE — 88305 TISSUE EXAM BY PATHOLOGIST: CPT

## 2017-04-25 RX ORDER — SODIUM CHLORIDE, SODIUM LACTATE, POTASSIUM CHLORIDE, CALCIUM CHLORIDE 600; 310; 30; 20 MG/100ML; MG/100ML; MG/100ML; MG/100ML
INJECTION, SOLUTION INTRAVENOUS
Status: COMPLETED
Start: 2017-04-25 | End: 2017-04-25

## 2017-04-25 RX ORDER — PROPOFOL 10 MG/ML
INJECTION, EMULSION INTRAVENOUS PRN
Status: DISCONTINUED | OUTPATIENT
Start: 2017-04-25 | End: 2017-04-25 | Stop reason: SDUPTHER

## 2017-04-25 RX ORDER — LIDOCAINE HYDROCHLORIDE 10 MG/ML
INJECTION, SOLUTION EPIDURAL; INFILTRATION; INTRACAUDAL; PERINEURAL
Status: COMPLETED
Start: 2017-04-25 | End: 2017-04-25

## 2017-04-25 RX ORDER — MAGNESIUM HYDROXIDE 1200 MG/15ML
LIQUID ORAL PRN
Status: DISCONTINUED | OUTPATIENT
Start: 2017-04-25 | End: 2017-04-25 | Stop reason: HOSPADM

## 2017-04-25 RX ORDER — ONDANSETRON 2 MG/ML
4 INJECTION INTRAMUSCULAR; INTRAVENOUS
Status: DISCONTINUED | OUTPATIENT
Start: 2017-04-25 | End: 2017-04-25 | Stop reason: HOSPADM

## 2017-04-25 RX ORDER — SODIUM CHLORIDE, SODIUM LACTATE, POTASSIUM CHLORIDE, CALCIUM CHLORIDE 600; 310; 30; 20 MG/100ML; MG/100ML; MG/100ML; MG/100ML
INJECTION, SOLUTION INTRAVENOUS CONTINUOUS
Status: DISCONTINUED | OUTPATIENT
Start: 2017-04-25 | End: 2017-04-25 | Stop reason: HOSPADM

## 2017-04-25 RX ORDER — LIDOCAINE HYDROCHLORIDE 10 MG/ML
1 INJECTION, SOLUTION EPIDURAL; INFILTRATION; INTRACAUDAL; PERINEURAL
Status: COMPLETED | OUTPATIENT
Start: 2017-04-25 | End: 2017-04-25

## 2017-04-25 RX ADMIN — LIDOCAINE HYDROCHLORIDE 0.1 ML: 10 INJECTION, SOLUTION EPIDURAL; INFILTRATION; INTRACAUDAL; PERINEURAL at 10:47

## 2017-04-25 RX ADMIN — PROPOFOL 200 MG: 10 INJECTION, EMULSION INTRAVENOUS at 14:47

## 2017-04-25 RX ADMIN — SODIUM CHLORIDE, SODIUM LACTATE, POTASSIUM CHLORIDE, CALCIUM CHLORIDE: 600; 310; 30; 20 INJECTION, SOLUTION INTRAVENOUS at 10:48

## 2017-04-25 RX ADMIN — SODIUM CHLORIDE, POTASSIUM CHLORIDE, SODIUM LACTATE AND CALCIUM CHLORIDE: 600; 310; 30; 20 INJECTION, SOLUTION INTRAVENOUS at 10:48

## 2017-04-25 ASSESSMENT — ENCOUNTER SYMPTOMS: STRIDOR: 0

## 2017-04-25 ASSESSMENT — PAIN - FUNCTIONAL ASSESSMENT: PAIN_FUNCTIONAL_ASSESSMENT: 0-10

## 2017-05-05 ENCOUNTER — HOSPITAL ENCOUNTER (OUTPATIENT)
Dept: PHARMACY | Age: 72
Discharge: HOME | End: 2017-05-05
Payer: MEDICARE

## 2017-05-05 DIAGNOSIS — I48.91 ATRIAL FIBRILLATION, UNSPECIFIED TYPE (HCC): ICD-10-CM

## 2017-05-05 LAB
INR BLD: 4.2
PROTIME: 50.5 SECONDS

## 2017-05-05 PROCEDURE — 85610 PROTHROMBIN TIME: CPT

## 2017-05-05 PROCEDURE — 99211 OFF/OP EST MAY X REQ PHY/QHP: CPT

## 2017-05-19 ENCOUNTER — HOSPITAL ENCOUNTER (OUTPATIENT)
Dept: PHARMACY | Age: 72
Discharge: HOME | End: 2017-05-19
Payer: MEDICARE

## 2017-05-19 DIAGNOSIS — I48.91 ATRIAL FIBRILLATION, UNSPECIFIED TYPE (HCC): ICD-10-CM

## 2017-05-19 LAB
INR BLD: 3.7
PROTIME: 44.7 SECONDS

## 2017-05-19 PROCEDURE — 99211 OFF/OP EST MAY X REQ PHY/QHP: CPT

## 2017-05-19 PROCEDURE — 85610 PROTHROMBIN TIME: CPT

## 2017-06-02 ENCOUNTER — HOSPITAL ENCOUNTER (OUTPATIENT)
Dept: PHARMACY | Age: 72
Discharge: HOME | End: 2017-06-02
Payer: MEDICARE

## 2017-06-02 DIAGNOSIS — I48.91 ATRIAL FIBRILLATION, UNSPECIFIED TYPE (HCC): ICD-10-CM

## 2017-06-02 LAB
INR BLD: 2.5
PROTIME: 30.5 SECONDS

## 2017-06-02 PROCEDURE — 99211 OFF/OP EST MAY X REQ PHY/QHP: CPT

## 2017-06-02 PROCEDURE — 85610 PROTHROMBIN TIME: CPT

## 2017-06-16 ENCOUNTER — HOSPITAL ENCOUNTER (OUTPATIENT)
Dept: PHARMACY | Age: 72
Setting detail: THERAPIES SERIES
Discharge: HOME | End: 2017-06-16
Payer: MEDICARE

## 2017-06-16 DIAGNOSIS — I48.91 ATRIAL FIBRILLATION, UNSPECIFIED TYPE (HCC): ICD-10-CM

## 2017-06-16 LAB
INR BLD: 2.8
PROTIME: 33.1 SECONDS

## 2017-06-16 PROCEDURE — 99211 OFF/OP EST MAY X REQ PHY/QHP: CPT

## 2017-06-16 PROCEDURE — 85610 PROTHROMBIN TIME: CPT

## 2017-07-14 ENCOUNTER — HOSPITAL ENCOUNTER (OUTPATIENT)
Dept: PHARMACY | Age: 72
Setting detail: THERAPIES SERIES
Discharge: HOME | End: 2017-07-14
Payer: MEDICARE

## 2017-07-14 DIAGNOSIS — I48.91 ATRIAL FIBRILLATION, UNSPECIFIED TYPE (HCC): ICD-10-CM

## 2017-07-14 LAB
INR BLD: 2.1
PROTIME: 25.8 SECONDS

## 2017-07-14 PROCEDURE — 85610 PROTHROMBIN TIME: CPT | Performed by: PHARMACIST

## 2017-07-14 PROCEDURE — 99211 OFF/OP EST MAY X REQ PHY/QHP: CPT | Performed by: PHARMACIST

## 2017-08-11 ENCOUNTER — HOSPITAL ENCOUNTER (OUTPATIENT)
Dept: PHARMACY | Age: 72
Setting detail: THERAPIES SERIES
Discharge: HOME | End: 2017-08-11
Payer: MEDICARE

## 2017-08-11 DIAGNOSIS — I48.91 ATRIAL FIBRILLATION, UNSPECIFIED TYPE (HCC): ICD-10-CM

## 2017-08-11 LAB
INR BLD: 2.5
PROTIME: 30.5 SECONDS

## 2017-08-11 PROCEDURE — 99211 OFF/OP EST MAY X REQ PHY/QHP: CPT

## 2017-08-11 PROCEDURE — 85610 PROTHROMBIN TIME: CPT

## 2017-09-07 ENCOUNTER — HOSPITAL ENCOUNTER (OUTPATIENT)
Dept: CT IMAGING | Age: 72
Discharge: HOME | End: 2017-09-07
Payer: MEDICARE

## 2017-09-07 VITALS
HEIGHT: 67 IN | HEART RATE: 82 BPM | DIASTOLIC BLOOD PRESSURE: 80 MMHG | SYSTOLIC BLOOD PRESSURE: 129 MMHG | WEIGHT: 220 LBS | RESPIRATION RATE: 16 BRPM | BODY MASS INDEX: 34.53 KG/M2

## 2017-09-07 DIAGNOSIS — R07.9 CHEST PAIN, UNSPECIFIED TYPE: ICD-10-CM

## 2017-09-07 DIAGNOSIS — R10.9 ABDOMINAL PAIN, UNSPECIFIED SITE: ICD-10-CM

## 2017-09-07 LAB
GFR AFRICAN AMERICAN: >60
GFR NON-AFRICAN AMERICAN: >60
PERFORMED ON: ABNORMAL
POC CREATININE: 0.6 MG/DL (ref 0.8–1.3)
POC SAMPLE TYPE: ABNORMAL

## 2017-09-07 PROCEDURE — 2500000003 HC RX 250 WO HCPCS: Performed by: RADIOLOGY

## 2017-09-07 PROCEDURE — 6360000004 HC RX CONTRAST MEDICATION: Performed by: RADIOLOGY

## 2017-09-07 PROCEDURE — 2580000003 HC RX 258: Performed by: RADIOLOGY

## 2017-09-07 PROCEDURE — 74160 CT ABDOMEN W/CONTRAST: CPT

## 2017-09-07 PROCEDURE — 71260 CT THORAX DX C+: CPT

## 2017-09-07 RX ORDER — SODIUM CHLORIDE 0.9 % (FLUSH) 0.9 %
10 SYRINGE (ML) INJECTION ONCE
Status: COMPLETED | OUTPATIENT
Start: 2017-09-07 | End: 2017-09-07

## 2017-09-07 RX ADMIN — SODIUM CHLORIDE, PRESERVATIVE FREE 10 ML: 5 INJECTION INTRAVENOUS at 11:02

## 2017-09-07 RX ADMIN — IOPAMIDOL 100 ML: 755 INJECTION, SOLUTION INTRAVENOUS at 11:02

## 2017-09-07 RX ADMIN — BARIUM SULFATE 450 ML: 21 SUSPENSION ORAL at 11:01

## 2017-09-15 ENCOUNTER — HOSPITAL ENCOUNTER (OUTPATIENT)
Dept: PHARMACY | Age: 72
Setting detail: THERAPIES SERIES
Discharge: HOME | End: 2017-09-15
Payer: MEDICARE

## 2017-09-15 DIAGNOSIS — I48.91 ATRIAL FIBRILLATION, UNSPECIFIED TYPE (HCC): ICD-10-CM

## 2017-09-15 LAB
INR BLD: 2.4
PROTIME: 28.6 SECONDS

## 2017-09-15 PROCEDURE — 85610 PROTHROMBIN TIME: CPT | Performed by: PHARMACIST

## 2017-09-15 PROCEDURE — 99211 OFF/OP EST MAY X REQ PHY/QHP: CPT | Performed by: PHARMACIST

## 2017-10-27 NOTE — PROGRESS NOTES
Mr. Joellen Nolasco is a 74 y.o. y/o male with history of Afib who presents today for anticoagulation monitoring and adjustment.   INR 2.7 is therapeutic for this patient (goal range 2-3) and is reflective of 17.5 mg TWD  Patient verifies current dosing regimen, patient able to verbally recall dose  Patient reports no missed doses since last INR   Patient denies s/sx clotting and/or stroke  Patient denies hematuria, epistaxis, rectal bleeding  Patient denies changes in diet, alcohol, or tobacco use  Reviewed medication list and drug allergies with patient, updated any medication additions or modifications accordingly  Patient also denies any pending medical or dental procedures scheduled at this time  Patient was instructed to continue 17.5 mg TWD and RTC 6 weeks

## 2017-12-04 PROBLEM — R18.8 ASCITES: Status: ACTIVE | Noted: 2017-01-01

## 2017-12-04 NOTE — ED NOTES
Report to RYAN Sanchez r/t Pt status with admit to 475.   Pt and family aware of plan of care with admit     Tushar Warner RN  12/04/17 4707

## 2017-12-04 NOTE — PROGRESS NOTES
Admission assessment complete and documented. Meds verbally verified with pt and wife. Pt has pain 2/10 and declines wanting intervention at this time. Pt is up with assistance and falls precautions initiated. No complaints at this time. Skin intact. Will monitor. Call light in reach.  Electronically signed by Dayanara Aparicio RN on 12/4/2017 at 5:09 PM

## 2017-12-04 NOTE — ED TRIAGE NOTES
Pt c/o back pain since Saturday morning, denies any injury or lifting. Pt had kidney infection the week before, last dose of ATB was Saturday. Back pain in middle of back on Saturday, today pain is middle and right sided.   Nothing taken for pain

## 2017-12-04 NOTE — ED PROVIDER NOTES
History reviewed. No pertinent family history. SOCIAL HISTORY       Social History     Social History    Marital status:      Spouse name: N/A    Number of children: N/A    Years of education: N/A     Social History Main Topics    Smoking status: Former Smoker     Types: Cigars    Smokeless tobacco: Never Used    Alcohol use No    Drug use: No    Sexual activity: Not Asked     Other Topics Concern    None     Social History Narrative    None       SCREENINGS             PHYSICAL EXAM    (up to 7 for level 4, 8 or more for level 5)     ED Triage Vitals   BP Temp Temp src Pulse Resp SpO2 Height Weight   12/04/17 1118 12/04/17 1118 -- 12/04/17 1119 12/04/17 1118 12/04/17 1119 12/04/17 1118 12/04/17 1118   116/79 97.9 °F (36.6 °C)  87 20 95 % 5' 8\" (1.727 m) 220 lb (99.8 kg)       Physical Exam   Constitutional: He is oriented to person, place, and time. He appears well-developed and well-nourished. HENT:   Head: Normocephalic and atraumatic. Eyes: Conjunctivae and EOM are normal. Pupils are equal, round, and reactive to light. Neck: Normal range of motion. Neck supple. Cardiovascular: Normal rate. Pulmonary/Chest: Effort normal.   Abdominal: Soft. Bowel sounds are normal. He exhibits no distension. There is no tenderness. Musculoskeletal: Normal range of motion. No CVA tenderness bilaterally. Patient does have paraspinous muscle spasming and increased tissue tension abnormality in the midthoracic region. Neurological: He is alert and oriented to person, place, and time. He has normal reflexes. Skin: Skin is warm and dry. Psychiatric: He has a normal mood and affect.        DIAGNOSTIC RESULTS     EKG: All EKG's are interpreted by the Emergency Department Physician who either signs or Co-signs this chart in the absence of a cardiologist.        RADIOLOGY:   Non-plain film images such as CT, Ultrasound and MRI are read by the radiologist. Plain radiographic images are visualized and preliminarily interpreted by the emergency physician with the below findings:    Patient has possible effusion at left base per my read    Interpretation per the Radiologist below, if available at the time of this note:    CT ABDOMEN PELVIS WO IV CONTRAST Additional Contrast? None   Final Result   CIRRHOSIS WITH ASCITES AND SPLENOMEGALY. THERE ARE SMALL INTRARENAL CALCULI. THERE IS NO CALCULUS WITHIN EITHER RENAL PELVIS AND THERE ARE NO CALCULI ALONG THE COURSE OF EITHER URETER. All CT scans at this facility use dose modulation, iterative reconstruction, and/or weight based dosing when appropriate to reduce radiation dose to as low as reasonably achievable. XR Chest Portable    (Results Pending)         ED BEDSIDE ULTRASOUND:   Performed by ED Physician - none    LABS:  Labs Reviewed   URINE RT REFLEX TO CULTURE - Abnormal; Notable for the following:        Result Value    Color, UA ORANGE (*)     Bilirubin Urine SMALL (*)     Protein, UA 30 (*)     Urobilinogen, Urine 2.0 (*)     Nitrite, Urine POSITIVE (*)     Leukocyte Esterase, Urine SMALL (*)     All other components within normal limits   CBC - Abnormal; Notable for the following:     WBC 3.9 (*)     RBC 3.71 (*)     Hemoglobin 10.5 (*)     Hematocrit 32.7 (*)     MCHC 32.2 (*)     RDW 19.1 (*)     Platelets 534 (*)     All other components within normal limits   COMPREHENSIVE METABOLIC PANEL - Abnormal; Notable for the following:     Glucose 119 (*)     CREATININE 0.58 (*)     Calcium 8.1 (*)     Total Protein 5.6 (*)     Alb 3.3 (*)     Total Bilirubin 1.8 (*)     All other components within normal limits   URINE CULTURE   CULTURE BLOOD #1   CULTURE BLOOD #2   MICROSCOPIC URINALYSIS       All other labs were within normal range or not returned as of this dictation.     EMERGENCY DEPARTMENT COURSE and DIFFERENTIAL DIAGNOSIS/MDM:   Vitals:    Vitals:    12/04/17 1118 12/04/17 1119 12/04/17 1352   BP: 116/79  119/77   Pulse:  87 80

## 2017-12-05 NOTE — PLAN OF CARE
Problem: Discharge Planning:  Goal: Patients continuum of care needs are met  Patients continuum of care needs are met   Outcome: Ongoing  CM and SW following with potential DC needs. Pt plans to DC home with wife.

## 2017-12-05 NOTE — PROGRESS NOTES
[facility] EMERGENCY [facility] AT [place] Respiratory Therapy Evaluation   Current Order:  ALBUTEROL 2 PUFFS PRN      Home Regimen: PRN      Ordering Physician: Nela Lubin  Re-evaluation Date:  N/A     Diagnosis: ASCITES      Patient Status: Stable / Unstable + Physician notified    The following MDI Criteria must be met in order to convert aerosol to MDI with spacer. If unable to meet, MDI will be converted to aerosol:  []  Patient able to demonstrate the ability to use MDI effectively  []  Patient alert and cooperative  []  Patient able to take deep breath with 5-10 second hold  []  Medication(s) available in this delivery method   []  Peak flow greater than or equal to 200 ml/min            Current Order Substituted To  (same drug, same frequency)   Aerosol to MDI [] Albuterol Sulfate 0.083% unit dose by aerosol Albuterol Sulfate MDI 2 puffs by inhalation with spacer    [] Levalbuterol 1.25 mg unit dose by aerosol Levalbuterol MDI 2 puffs by inhalation with spacer    [] Levalbuterol 0.63 mg unit dose by aerosol Levalbuterol MDI 2 puffs by inhalation with spacer    [] Ipratropium Bromide 0.02% unit dose by aerosol Ipratropium Bromide MDI 2 puffs by inhalation with spacer    [] Duoneb (Ipratropium + Albuterol) unit dose by aerosol Ipratropium MDI + Albuterol MDI 2 puffs by inhalation w/spacer   MDI to Aerosol [] Albuterol Sulfate MDI Albuterol Sulfate 0.083% unit dose by aerosol    [] Levalbuterol MDI 2 puffs by inhalation Levalbuterol 1.25 mg unit dose by aerosol    [] Ipratropium Bromide MDI by inhalation Ipratropium Bromide 0.02% unit dose by aerosol    [] Combivent (Ipratropium + Albuterol) MDI by inhalation Duoneb (Ipratropium + Albuterol) unit dose by aerosol   Treatment Assessment [Frequency/Schedule]:  Change frequency to: _______ALBUTEROL 2 PUFFS Q4 PRN___________________________________________per Protocol, P&T, MEC      Points 0 1 2 3 4   Pulmonary Status  Non-Smoker  []   Smoking history   < 20 pack years  []   Smoking history  ?  21 pack years  [x]   Pulmonary Disorder  (acute or chronic)  []   Severe or Chronic w/ Exacerbation  []     Surgical Status No [x]   Surgeries     General []   Surgery Lower []   Abdominal Thoracic or []   Upper Abdominal Thoracic with  PulmonaryDisorder  []     Chest X-ray Clear/Not  Ordered     []  Chronic Changes  Results Pending  [x]  Infiltrates, atelectasis, pleural effusion, or edema  []  Infiltrates in more than one lobe []  Infiltrate + Atelectasis, &/or pleural effusion  []    Respiratory Pattern Regular,  RR = 12-20 [x]  Increased,  RR = 21-25 []  MCLEAN, irregular,  or RR = 26-30 []  Decreased FEV1  or RR = 31-35 []  Severe SOB, use  of accessory muscles, or RR ? 35  []    Mental Status Alert, oriented,  Cooperative [x]  Confused but Follows commands []  Lethargic or unable to follow commands []  Obtunded  []  Comatose  []    Breath Sounds Clear to  auscultation  [x]  Decreased unilaterally or  in bases only []  Decreased  bilaterally  []  Crackles or intermittent wheezes []  Wheezes []    Cough Strong, Spontan., & nonproductive [x]  Strong,  spontaneous, &  productive []  Weak,  Nonproductive []  Weak, productive or  with wheezes []  No spontaneous  cough or may require suctioning []    Level of Activity Ambulatory []  Ambulatory w/ Assist  [x]  Non-ambulatory []  Paraplegic []  Quadriplegic []    Total    Score:_4______     Triage Score:___5_____      Tri       Triage:     1. (>20) Freq: Q3    2. (16-20) Freq: Q4   3. (11-15) Freq: QID & Albuterol Q2 PRN    4. (6-10) Freq: TID & Albuterol Q2 PRN    5. (0-5) Freq Q4prn

## 2017-12-05 NOTE — PLAN OF CARE
Problem: Infection:  Goal: Will remain free from infection  Will remain free from infection  Outcome: Ongoing      Problem: Safety:  Goal: Free from accidental physical injury  Free from accidental physical injury  Outcome: Ongoing    Goal: Free from intentional harm  Free from intentional harm  Outcome: Ongoing

## 2017-12-05 NOTE — H&P
[address]                       [address], [address] [facility]                               HISTORY AND PHYSICAL    PATIENT NAME: Reyna Gentile                  :        1945  MED REC NO:   [id number]                            ROOM:       [id number]  ACCOUNT NO:   [de-identified]                           ADMIT DATE: 2017  PROVIDER:     Carly Sanchez DO    HISTORY OF PRESENT ILLNESS:  This is a 68-year-old obese male admitted to  the hospital through the emergency room with bilateral flank pain. During  his evaluation in the emergency room with a CAT scan, he was noted to have  splenomegaly, a shrunken liver with ascites. The patient denies any true  abdominal pain. He has been having issues with the kidney stones in the  past.  He has had pain in his back for the past 3 days. He was recently  placed on an antibiotic for urinary tract infection that resolved his pain  only to reoccur causing him to come to the emergency room. At this time,  the patient denies any specific issues. ALLERGIES TO MEDICATIONS:  NIACIN. MEDICATIONS:  At the time of admission, Zocor, Coumadin, Desyrel, Flomax,  Norvasc, Tradjenta, albuterol, Bystolic, Glucotrol, Bumex, Vytorin,  aspirin. HABITS:  Quit smoking years ago. No alcohol use. No narcotic use. PAST SURGICAL HISTORY:  Bilateral hip surgery, left shoulder surgery,  coronary artery bypass grafting, EGD. PHYSICAL EXAMINATION:  VITAL SIGNS:  5 feet 8 inches, 220 pounds. On admission, the patient's  blood pressure was 120/80, heart rate 84 and regular, respirations 20 a  minute and afebrile. HEENT:  Normocephalic. Pupils reactive to light. Extraocular muscles  intact. NECK:  Supple. No JVD or adenopathy. LUNGS:  Relatively clear. No wheezing, rales or rhonchi or accessory  muscle use. CHEST:  Chest wall shows midline healed surgical scar. HEART:  Regular with 1/6 systolic murmur.   ABDOMEN: Nontender. There is some distention. Bowel sounds are present. Decreased. No guarding or rigidity. EXTREMITIES:  Percussion of the lumbar spine shows some minor tenderness to  the bilateral paraspinal muscles at T12-L3 and lower limbs show no edema. SKIN:  Warm and dry. No cyanosis. IMPRESSION:  1. Flank pain, etiology uncertain, possible musculoskeletal versus kidney  stone disease. 2.  Suspected recurrent urinary tract infection. 3.  Possible cirrhosis with splenomegaly and ascites, etiology uncertain. 4.  Status post coronary artery bypass grafting. 5.  Diabetes mellitus type 2.  6.  Hyperlipidemia. 7.  COPD. PLAN:  Medications reviewed and we will order as needed. Obtain INR. CT  scan of the abdomen to rule out vascular disease of the hepatic vein,  control blood sugars and blood pressure, GI to evaluate the patient.         Tamara Ling DO    D: 12/05/2017 9:04:31       T: 12/05/2017 9:08:04     [email or web address]  Job#: [id number]     Doc#: [id number]    CC:

## 2017-12-05 NOTE — PLAN OF CARE
Problem: Infection:  Goal: Will remain free from infection  Will remain free from infection   Outcome: Ongoing  Labs and VS monitored

## 2017-12-05 NOTE — PLAN OF CARE
Problem: Safety:  Goal: Free from intentional harm  Free from intentional harm   Outcome: Ongoing  Preventive safety measures in place

## 2017-12-05 NOTE — PROGRESS NOTES
Pm assessment complete. Pt alert and oriented. Pt raudel any pain at this time. Pt watching tv in his room. Pt on falls precaution from falling at home a couple days ago. Pt abdomen is slightly distended. Pt has bowl sounds in all 4 quadrants. Will continue to monitor pt.   Electronically signed by Mansoor Deleon RN on 12/5/2017 at 1:36 AM

## 2017-12-05 NOTE — CARE COORDINATION
PATIENT IS FROM HOME W/ SP. PATIENT PLANS TO RETURN HOME AND PT DENIES ANY NEEDS. LSW/C3 TO FOLLOW FOR ANY CHANGES TO THE DISCHARGE PLAN.

## 2017-12-05 NOTE — PLAN OF CARE
Problem: Pain:  Goal: Patient's pain/discomfort is manageable  Patient's pain/discomfort is manageable   Outcome: Ongoing  PRN pain meds in place and being utilized as necessary

## 2017-12-05 NOTE — PLAN OF CARE
Problem: Falls - Risk of  Goal: Absence of falls  Outcome: Ongoing  Preventive falls precautions in place

## 2017-12-05 NOTE — PROGRESS NOTES
12/5/17 Patient with prolonged INR. Possibly related to dosing or cirrohsis INR high will give FFP and vitamin K follow lab. Afebrile BP fine. Paracentesis on hold d/t INR  Will have Lopez evaluate with me.  Heart   irregular  Lungs clear  No edema of limbs No chest pains or SOB no abdominal pains +BS  Has had strong history of ETOH abuse up until 10 years ago !!

## 2017-12-06 NOTE — PROGRESS NOTES
Timbo Dubois is a 74 y.o. male patient.     Current Facility-Administered Medications   Medication Dose Route Frequency Provider Last Rate Last Dose    ciprofloxacin (CIPRO) tablet 500 mg  500 mg Oral BID Latrice Gehrig, DO   500 mg at 12/06/17 1023    HYDROcodone-acetaminophen (NORCO) 5-325 MG per tablet 1 tablet  1 tablet Oral Q4H PRN Latrice Gehrig, DO   1 tablet at 12/06/17 0546    sodium chloride flush 0.9 % injection 10 mL  10 mL Intravenous 2 times per day Latrice Gehrig, DO   10 mL at 12/06/17 1026    sodium chloride flush 0.9 % injection 10 mL  10 mL Intravenous PRN Latrice Gehrig, DO        acetaminophen (TYLENOL) tablet 650 mg  650 mg Oral Q4H PRN Latrice Gehrig, DO   650 mg at 12/05/17 1817    glucose (GLUTOSE) 40 % oral gel 15 g  15 g Oral PRN Latrice Gehrig, DO        dextrose 50 % solution 12.5 g  12.5 g Intravenous PRN [facility]        glucagon (rDNA) injection 1 mg  1 mg Intramuscular PRN Latrice Gehrig, DO        dextrose 5 % solution  100 mL/hr Intravenous PRN Latrice Gehrig, DO        insulin lispro (HUMALOG) injection vial 0-6 Units  0-6 Units Subcutaneous TID [facility]        insulin lispro (HUMALOG) injection vial 0-3 Units  0-3 Units Subcutaneous Nightly [place] Foil Bescak, DO        amLODIPine (NORVASC) tablet 5 mg  5 mg Oral Daily [facility] Bescak, DO   5 mg at 12/06/17 1023    aspirin chewable tablet 81 mg  81 mg Oral Daily [place] Foil Bescak, DO   81 mg at 12/06/17 1023    bumetanide (BUMEX) tablet 1 mg  1 mg Oral Dinner Latrice Gehrig, DO   1 mg at 12/05/17 1743    glipiZIDE (GLUCOTROL) tablet 5 mg  5 mg Oral Dinner Latrice Gehrig, DO   5 mg at 12/05/17 1744    linagliptin (TRADJENTA) tablet 5 mg  5 mg Oral Daily [place] Foil Bescak, DO   5 mg at 12/06/17 1023    metFORMIN (GLUCOPHAGE) tablet 1,000 mg  1,000 mg Oral BID WC Juliano Kidd DO   1,000 mg at 12/06/17 1023    nebivolol (BYSTOLIC) tablet 10 mg  10 mg Oral Daily Latrice Wright DO 10 mg at 12/06/17 1023    simvastatin (ZOCOR) tablet 40 mg  40 mg Oral Nightly Tellis Needle, DO   40 mg at 12/05/17 2141    tamsulosin (FLOMAX) capsule 0.4 mg  0.4 mg Oral Dinner Tellis Needle, DO   0.4 mg at 12/05/17 1744    traZODone (DESYREL) tablet 150 mg  150 mg Oral Nightly Tellis Needle, DO   150 mg at 12/05/17 2141    albuterol sulfate  (90 Base) MCG/ACT inhaler 2 puff  2 puff Inhalation Q4H PRN Tellis Needle, DO         Allergies   Allergen Reactions    Niacin Rash     Active Problems:    Ascites    Blood pressure (!) 144/81, pulse 73, temperature 97.7 °F (36.5 °C), temperature source Oral, resp. rate 18, height 5' 8\" (1.727 m), weight 220 lb (99.8 kg), SpO2 98 %. Subjective:  Symptoms:  Stable. No shortness of breath, cough, chest pain, chest pressure or diarrhea. Diet:  Adequate intake. No nausea or vomiting. Activity level: Normal.    Pain:  He reports no pain. Objective:  General Appearance:  Comfortable. Vital signs: (most recent): Blood pressure (!) 144/81, pulse 73, temperature 97.7 °F (36.5 °C), temperature source Oral, resp. rate 18, height 5' 8\" (1.727 m), weight 220 lb (99.8 kg), SpO2 98 %. Vital signs are normal.    Output: Producing urine and producing stool. HEENT: Normal HEENT exam.    Lungs:  Normal effort. He is not in respiratory distress. Heart: Normal rate. Regular rhythm. No murmur. Chest: No chest wall tenderness. Abdomen: Abdomen is soft, flat and non-distended. Bowel sounds are normal.   There is no abdominal tenderness. There is no epigastric area or suprapubic area tenderness. Extremities: Normal range of motion. Pulses: Distal pulses are intact. Neurological: Patient is alert and oriented to person, place and time. Assessment:    Condition: In stable condition. Plan:   Encourage ambulation and out of bed and up to chair. Consults: gastroenterology. Regular diet.   (12/6/17  Paracentesis today increase activity maintain diet  Review lab testing).        Denilson Patterson,   12/6/2017

## 2017-12-06 NOTE — PROGRESS NOTES
Patient complaining of 8/10 lower back pain. Received 650 mg of tylenol at 1817 but it did not help. He has taken percocet and norco in the past that have seemed to help. Call out to Dr. Jeffrey Dunlap. Ordered (1) norco 5-325 q4-6 hours PRN and apply ice packs to lower back.  Electronically signed by Uri Mejia RN on 12/5/2017 at 9:28 PM

## 2017-12-06 NOTE — PLAN OF CARE
Problem: Discharge Planning:  Goal: Patients continuum of care needs are met  Patients continuum of care needs are met   Outcome: Ongoing  Pt from home with wife and anticipates DC home from here at hospital. CM/SW following.

## 2017-12-06 NOTE — PROGRESS NOTES
Assessment completed and documented; night meds given. Patient is resting in bed. Medicated with norco as ordered for back pain. He denies any other needs at this time. Bed is in lowest position with alarm on and call light within reach. Will continue to monitor.  Electronically signed by Shreyas Smalls RN on 12/5/2017 at 10:09 PM

## 2017-12-06 NOTE — CONSULTS
Inpatient consult to GI  Consult performed by: Jaden Jim  Consult ordered by: Kwame Hodges        Patient Name: Mildred Joseph Date: 2017 11:23 AM  MR #: [id number]  : 1945    Attending Physician: Latrice Wright DO  Reason for consult: Ascites    History of Presenting Illness:      Timbo Dubois is a 74 y.o. male on hospital day 1 with a history of back pain. History Obtained From:  patient, spouse. Patient admitted to the hospital when he presented with worsening back pain off 3-4 days' duration. Patient prior to this has been on antibiotic treatment for suspected urinary tract infection 10 days prior. On presentation to the ER patient underwent a CAT scan of the abdomen which showed cirrhosis, ascites and splenomegaly in addition to sigmoid diverticulosis. Interestingly patient has had CAT scan prior to this in 2017 and 2016 both of which did not show any evidence for cirrhosis. A detailed history from patient it appears patient quit excessive alcohol intake 10 years ago. Patient started drinking excessively in his mid 25s until the age of 70. Patient denies any previous history of jaundice, ascites, liver disease. Patient's wife does report patient reporting excessive lethargy, malaise some difficulty with memory recall in the last few weeks. Although patient denies it but the wife has noticed increased abdominal girth in the last few weeks. Patient has also lost significant appetite in the last few days.     History:      Past Medical History:   Diagnosis Date    Atrial fibrillation ([address]     Diabetes mellitus ([address]     Hyperlipidemia     Hypertension      Past Surgical History:   Procedure Laterality Date    CORONARY ARTERY BYPASS GRAFT  2006    HIP SURGERY Bilateral 1996    SC EGD TRANSORAL BIOPSY SINGLE/MULTIPLE N/A 2017    EGD BIOPSY performed by Latrice Wright DO at [facility]        Family History  History enhancement. Multiplanar reformatting was performed. FINDINGS:  There are signs of cirrhosis. There is mild splenomegaly. There is new, small volume of four-quadrant ascites, greatest around the liver. There is no free peritoneal air. Bile ducts are nondilated. The gallbladder is unremarkable. There is no sign of an hepatic mass. The umbilical vein is visible, without evidence of recanalization The pancreas is unremarkable. Adrenal glands are unremarkable. There is no hydronephrosis. There is a 4 mm stone within the calyx in the middle third of the right kidney. There are no calculi within either renal pelvis or along the course of either ureter. There is no postobstructive perinephric stranding. The heavily calcified aorta is not dilated. The stomach and small bowel are unremarkable. The appendix is normal. There is diverticulosis, greatest in the sigmoid colon, without diverticulitis. The pelvic floor is obscured by metal artifact arising from the patient's bilateral hip implants. There are surgical staples at site of a repaired umbilical hernia. There is degenerative disease in the spine. There is no acute process in the spine. There is moderate cardiomegaly. There are small pleural effusions. There is no sign of pulmonary edema or active infiltrate within the field of view. CIRRHOSIS WITH ASCITES AND SPLENOMEGALY. THERE ARE SMALL INTRARENAL CALCULI. THERE IS NO CALCULUS WITHIN EITHER RENAL PELVIS AND THERE ARE NO CALCULI ALONG THE COURSE OF EITHER URETER. All CT scans at this facility use dose modulation, iterative reconstruction, and/or weight based dosing when appropriate to reduce radiation dose to as low as reasonably achievable. Impression:   13-year-old male with new findings off cirrhosis, ascites and splenomegaly. Patient does have risk factors in the form of excessive alcohol use or 40 years, diabetes, central obesity.   Plan:   Cirrhosis   Cause of liver disease: TORRES and long etoh use  -

## 2017-12-06 NOTE — PLAN OF CARE
Problem: Pain:  Goal: Patient's pain/discomfort is manageable  Patient's pain/discomfort is manageable   Outcome: Ongoing  Pt has denied pain thus far today. He is aware of the PRN pain meds that he has available. Will continue to assess pain as necessary.

## 2017-12-07 NOTE — PROGRESS NOTES
12/7/17 Discussed with Dr Salome Maher suspect TORRES  Not enough fluid to do papcentesis. Will discharge today follow in office. Start coumadin in am go to Coumadin clinic weekly. Will get appointment. With Dr Salome Maher. Continue home medication add amoxicillin with abnormal UA. Heart regular  Lungs clear  No abdominal pains. Local roght lumbar pain above sacrum. Icepacks and tylenol X-ray pending.

## 2017-12-07 NOTE — CARE COORDINATION
Discharge plan remains to discharge home with spouse. Denies any additional needs. Will follow as needs arise.

## 2017-12-07 NOTE — FLOWSHEET NOTE
Assessment complete. No complaints at this time. Lungs sound clear. Heart sounds irregular. ABD sounds are present. Patient stated he had two bowel movements in the past hour. No other complaints at this time.      Electronically signed by Milan Bustamante RN on 12/6/17 at 8:36 PM

## 2017-12-29 NOTE — PROGRESS NOTES
Subjective:     Jessica Brandt is a 74 y.o. male who presents 12/28/2017 with:    Chief Complaint   Patient presents with    Follow-up       Patient seen in clinic for f/u regarding recently diagnosed liver nodules on CT and subsequently MRI abd. Longstanding history of alcohol abuse and related cirrhosis as well as portal hypertension and ascites. Recently medication changes made adding Bumex and aldactone, patient with ~20lb weight loss from significant diuresis as well as improved symptoms and decreased abd distention. MRI abdomen revealed ADVANCED CIRRHOSIS, WITH INNUMERABLE REGENERATIVE NODULES. OF THE SEVERAL SLIGHTLY T2 HYPERINTENSE LESIONS, ONE EXHIBITS ABNORMAL RESTRICTED DIFFUSION. ALTHOUGH IT EXHIBITS EARLY ENHANCEMENT, IT SHOWS PERSISTENT STAINING, WITHOUT APPRECIABLE WASHOUT. Concern for malignancy and discussion had regarding importance of biopsy of these nodules for prognostic and treatment decisions. Patient and his spouse are agreeable, plan is for referral to IR. Clinical course is complicated by long term anticoagulation with warfarin as well as bleeding risk due to cirrhosis. Patient Active Problem List   Diagnosis    Atrial fibrillation ([address]    Dysphagia    Ascites     Past Medical History:   Diagnosis Date    Atrial fibrillation ([address]     Diabetes mellitus ([address]     Hyperlipidemia     Hypertension      Past Surgical History:   Procedure Laterality Date    CORONARY ARTERY BYPASS GRAFT  2006    HIP SURGERY Bilateral 1996    [place] EGD TRANSORAL BIOPSY SINGLE/MULTIPLE N/A 4/25/2017    EGD BIOPSY performed by Wynette Sacks, DO at [facility]      Social History     Social History    Marital status:      Spouse name: N/A    Number of children: N/A    Years of education: N/A     Occupational History    Not on file.      Social History Main Topics    Smoking status: Former Smoker     Types: Cigars    Smokeless tobacco: Never Used    Alcohol use No    Drug use: No    Sexual activity: Not on file     Other Topics Concern    Not on file     Social History Narrative    No narrative on file     No family history on file. Allergies:  Niacin  Current Outpatient Prescriptions   Medication Sig Dispense Refill    albuterol sulfate HFA (PROAIR HFA) 108 (90 Base) MCG/ACT inhaler Inhale 2 puffs into the lungs as needed      warfarin (COUMADIN) 5 MG tablet Take as directed by [facility] [place] [facility]. Quantity equals 90 day supply. (Patient taking differently: Take 2.5 mg by mouth daily Take as directed by [facility] [place] [facility]. Quantity equals 90 day supply.) 60 tablet 1    traZODone (DESYREL) 150 MG tablet Take 150 mg by mouth nightly       tamsulosin (FLOMAX) 0.4 MG capsule Take 1 capsule by mouth daily (Patient taking differently: Take 0.4 mg by mouth Daily with supper ) 60 capsule 5    amLODIPine (NORVASC) 5 MG tablet Take 5 mg by mouth daily       albuterol sulfate  (90 BASE) MCG/ACT inhaler Inhale 2 puffs into the lungs      nitroGLYCERIN (NITROSTAT) 0.4 MG SL tablet Place 0.4 mg under the tongue      linagliptin (TRADJENTA) 5 MG tablet Take 5 mg by mouth daily       ezetimibe-simvastatin (VYTORIN) 10-40 MG per tablet Take 1 tablet by mouth      nebivolol (BYSTOLIC) 5 MG tablet Take 10 mg by mouth daily       bumetanide (BUMEX) 0.5 MG tablet Take 1 mg by mouth Daily with supper       aspirin 81 MG chewable tablet Take 81 mg by mouth daily       spironolactone (ALDACTONE) 25 MG tablet Take 25 mg by mouth daily  2     No current facility-administered medications for this visit. Review of Systems   Constitutional: Negative for activity change, appetite change, chills, diaphoresis, fatigue and unexpected weight change. Respiratory: Negative for apnea, cough, chest tightness, shortness of breath and wheezing.     Cardiovascular: Negative for chest pain, palpitations and leg

## 2018-01-01 ENCOUNTER — APPOINTMENT (OUTPATIENT)
Dept: PHARMACY | Age: 73
End: 2018-01-01
Payer: MEDICARE

## 2018-01-01 ENCOUNTER — ANESTHESIA (OUTPATIENT)
Dept: OPERATING ROOM | Age: 73
DRG: 433 | End: 2018-01-01
Payer: MEDICARE

## 2018-01-01 ENCOUNTER — APPOINTMENT (OUTPATIENT)
Dept: ULTRASOUND IMAGING | Age: 73
DRG: 433 | End: 2018-01-01
Attending: INTERNAL MEDICINE
Payer: MEDICARE

## 2018-01-01 ENCOUNTER — APPOINTMENT (OUTPATIENT)
Dept: INTERVENTIONAL RADIOLOGY/VASCULAR | Age: 73
DRG: 432 | End: 2018-01-01
Payer: MEDICARE

## 2018-01-01 ENCOUNTER — TELEPHONE (OUTPATIENT)
Dept: PHARMACY | Age: 73
End: 2018-01-01

## 2018-01-01 ENCOUNTER — APPOINTMENT (OUTPATIENT)
Dept: GENERAL RADIOLOGY | Age: 73
DRG: 433 | End: 2018-01-01
Attending: INTERNAL MEDICINE
Payer: MEDICARE

## 2018-01-01 ENCOUNTER — HOSPITAL ENCOUNTER (OUTPATIENT)
Dept: PHARMACY | Age: 73
Setting detail: THERAPIES SERIES
Discharge: HOME | End: 2018-07-06
Payer: MEDICARE

## 2018-01-01 ENCOUNTER — TELEPHONE (OUTPATIENT)
Dept: GASTROENTEROLOGY | Age: 73
End: 2018-01-01

## 2018-01-01 ENCOUNTER — TELEPHONE (OUTPATIENT)
Dept: INTERVENTIONAL RADIOLOGY/VASCULAR | Age: 73
End: 2018-01-01

## 2018-01-01 ENCOUNTER — TELEPHONE (OUTPATIENT)
Dept: GENERAL RADIOLOGY | Age: 73
End: 2018-01-01

## 2018-01-01 ENCOUNTER — HOSPITAL ENCOUNTER (OUTPATIENT)
Dept: PHARMACY | Age: 73
Setting detail: THERAPIES SERIES
Discharge: HOME | End: 2018-06-22
Payer: MEDICARE

## 2018-01-01 ENCOUNTER — ANTI-COAG VISIT (OUTPATIENT)
Dept: PHARMACY | Age: 73
End: 2018-01-01

## 2018-01-01 ENCOUNTER — HOSPITAL ENCOUNTER (OUTPATIENT)
Dept: CT IMAGING | Age: 73
Discharge: HOME | End: 2018-01-12
Payer: MEDICARE

## 2018-01-01 ENCOUNTER — HOSPITAL ENCOUNTER (OUTPATIENT)
Dept: GENERAL RADIOLOGY | Age: 73
Discharge: HOME | DRG: 433 | End: 2018-07-21
Payer: MEDICARE

## 2018-01-01 ENCOUNTER — OFFICE VISIT (OUTPATIENT)
Dept: GASTROENTEROLOGY | Age: 73
End: 2018-01-01
Payer: MEDICARE

## 2018-01-01 ENCOUNTER — HOSPITAL ENCOUNTER (OUTPATIENT)
Dept: PHARMACY | Age: 73
Setting detail: THERAPIES SERIES
Discharge: HOME | End: 2018-02-02
Payer: MEDICARE

## 2018-01-01 ENCOUNTER — HOSPITAL ENCOUNTER (OUTPATIENT)
Dept: PHARMACY | Age: 73
Setting detail: THERAPIES SERIES
Discharge: HOME | End: 2018-01-18
Payer: MEDICARE

## 2018-01-01 ENCOUNTER — CARE COORDINATION (OUTPATIENT)
Dept: CASE MANAGEMENT | Age: 73
End: 2018-01-01

## 2018-01-01 ENCOUNTER — ANESTHESIA EVENT (OUTPATIENT)
Dept: OPERATING ROOM | Age: 73
DRG: 433 | End: 2018-01-01
Payer: MEDICARE

## 2018-01-01 ENCOUNTER — APPOINTMENT (OUTPATIENT)
Dept: ULTRASOUND IMAGING | Age: 73
End: 2018-01-01
Payer: MEDICARE

## 2018-01-01 ENCOUNTER — HOSPITAL ENCOUNTER (INPATIENT)
Age: 73
LOS: 1 days | Discharge: HOSPICE | DRG: 432 | End: 2018-09-15
Attending: INTERNAL MEDICINE | Admitting: INTERNAL MEDICINE
Payer: MEDICARE

## 2018-01-01 ENCOUNTER — INITIAL CONSULT (OUTPATIENT)
Dept: INTERVENTIONAL RADIOLOGY/VASCULAR | Age: 73
End: 2018-01-01

## 2018-01-01 ENCOUNTER — HOSPITAL ENCOUNTER (OUTPATIENT)
Dept: ULTRASOUND IMAGING | Age: 73
Discharge: HOME | End: 2018-08-19
Payer: MEDICARE

## 2018-01-01 ENCOUNTER — HOSPITAL ENCOUNTER (OUTPATIENT)
Dept: PHARMACY | Age: 73
Setting detail: THERAPIES SERIES
Discharge: HOME | End: 2018-03-16
Payer: MEDICARE

## 2018-01-01 ENCOUNTER — HOSPITAL ENCOUNTER (OUTPATIENT)
Age: 73
Setting detail: OBSERVATION
LOS: 1 days | Discharge: HOME | End: 2018-08-18
Attending: STUDENT IN AN ORGANIZED HEALTH CARE EDUCATION/TRAINING PROGRAM | Admitting: INTERNAL MEDICINE
Payer: MEDICARE

## 2018-01-01 ENCOUNTER — HOSPITAL ENCOUNTER (INPATIENT)
Age: 73
LOS: 4 days | Discharge: HOME | DRG: 433 | End: 2018-06-11
Attending: INTERNAL MEDICINE | Admitting: INTERNAL MEDICINE
Payer: MEDICARE

## 2018-01-01 ENCOUNTER — TELEPHONE (OUTPATIENT)
Dept: PHARMACY | Facility: CLINIC | Age: 73
End: 2018-01-01

## 2018-01-01 ENCOUNTER — HOSPITAL ENCOUNTER (OUTPATIENT)
Dept: PHARMACY | Age: 73
Setting detail: THERAPIES SERIES
Discharge: HOME | End: 2018-09-05
Payer: MEDICARE

## 2018-01-01 ENCOUNTER — HOSPITAL ENCOUNTER (OUTPATIENT)
Dept: PHARMACY | Age: 73
Setting detail: THERAPIES SERIES
Discharge: HOME | End: 2018-01-11
Payer: MEDICARE

## 2018-01-01 ENCOUNTER — APPOINTMENT (OUTPATIENT)
Dept: CT IMAGING | Age: 73
DRG: 432 | End: 2018-01-01
Payer: MEDICARE

## 2018-01-01 ENCOUNTER — APPOINTMENT (OUTPATIENT)
Dept: GENERAL RADIOLOGY | Age: 73
End: 2018-01-01
Payer: MEDICARE

## 2018-01-01 ENCOUNTER — HOSPITAL ENCOUNTER (OUTPATIENT)
Dept: GENERAL RADIOLOGY | Age: 73
Discharge: HOME | DRG: 433 | End: 2018-07-22
Payer: MEDICARE

## 2018-01-01 ENCOUNTER — HOSPITAL ENCOUNTER (INPATIENT)
Age: 73
LOS: 11 days | Discharge: HOME HEALTH | DRG: 433 | End: 2018-08-01
Attending: INTERNAL MEDICINE | Admitting: INTERNAL MEDICINE
Payer: MEDICARE

## 2018-01-01 ENCOUNTER — HOSPITAL ENCOUNTER (OUTPATIENT)
Dept: PHARMACY | Age: 73
Setting detail: THERAPIES SERIES
Discharge: HOME | End: 2018-04-27
Payer: MEDICARE

## 2018-01-01 ENCOUNTER — HOSPITAL ENCOUNTER (OUTPATIENT)
Dept: ULTRASOUND IMAGING | Age: 73
Discharge: HOME | End: 2018-09-07
Payer: MEDICARE

## 2018-01-01 ENCOUNTER — HOSPITAL ENCOUNTER (OUTPATIENT)
Dept: PHARMACY | Age: 73
Setting detail: THERAPIES SERIES
Discharge: HOME | End: 2018-08-23
Payer: MEDICARE

## 2018-01-01 ENCOUNTER — OFFICE VISIT (OUTPATIENT)
Dept: INTERVENTIONAL RADIOLOGY/VASCULAR | Age: 73
End: 2018-01-01

## 2018-01-01 ENCOUNTER — HOSPITAL ENCOUNTER (OUTPATIENT)
Dept: PHARMACY | Age: 73
Setting detail: THERAPIES SERIES
Discharge: HOME | End: 2018-07-16
Payer: MEDICARE

## 2018-01-01 VITALS
TEMPERATURE: 97.2 F | WEIGHT: 228 LBS | DIASTOLIC BLOOD PRESSURE: 41 MMHG | HEIGHT: 67 IN | BODY MASS INDEX: 35.79 KG/M2 | RESPIRATION RATE: 20 BRPM | SYSTOLIC BLOOD PRESSURE: 104 MMHG | HEART RATE: 94 BPM | OXYGEN SATURATION: 100 %

## 2018-01-01 VITALS
SYSTOLIC BLOOD PRESSURE: 110 MMHG | WEIGHT: 200.2 LBS | OXYGEN SATURATION: 95 % | HEART RATE: 64 BPM | DIASTOLIC BLOOD PRESSURE: 60 MMHG | BODY MASS INDEX: 31.36 KG/M2 | RESPIRATION RATE: 14 BRPM

## 2018-01-01 VITALS
HEIGHT: 67 IN | WEIGHT: 221.78 LBS | SYSTOLIC BLOOD PRESSURE: 112 MMHG | DIASTOLIC BLOOD PRESSURE: 58 MMHG | TEMPERATURE: 97.9 F | RESPIRATION RATE: 13 BRPM | HEART RATE: 86 BPM | BODY MASS INDEX: 34.81 KG/M2 | OXYGEN SATURATION: 99 %

## 2018-01-01 VITALS
BODY MASS INDEX: 32.33 KG/M2 | SYSTOLIC BLOOD PRESSURE: 110 MMHG | HEART RATE: 81 BPM | OXYGEN SATURATION: 94 % | DIASTOLIC BLOOD PRESSURE: 60 MMHG | WEIGHT: 206 LBS | HEIGHT: 67 IN

## 2018-01-01 VITALS
WEIGHT: 203 LBS | DIASTOLIC BLOOD PRESSURE: 51 MMHG | TEMPERATURE: 98.8 F | OXYGEN SATURATION: 100 % | BODY MASS INDEX: 31.86 KG/M2 | SYSTOLIC BLOOD PRESSURE: 114 MMHG | HEIGHT: 67 IN | HEART RATE: 94 BPM | RESPIRATION RATE: 18 BRPM

## 2018-01-01 VITALS
RESPIRATION RATE: 16 BRPM | HEART RATE: 89 BPM | DIASTOLIC BLOOD PRESSURE: 70 MMHG | OXYGEN SATURATION: 96 % | SYSTOLIC BLOOD PRESSURE: 108 MMHG

## 2018-01-01 VITALS
DIASTOLIC BLOOD PRESSURE: 70 MMHG | WEIGHT: 204.2 LBS | RESPIRATION RATE: 14 BRPM | SYSTOLIC BLOOD PRESSURE: 128 MMHG | HEART RATE: 89 BPM | OXYGEN SATURATION: 99 % | BODY MASS INDEX: 31.98 KG/M2

## 2018-01-01 VITALS
DIASTOLIC BLOOD PRESSURE: 64 MMHG | OXYGEN SATURATION: 98 % | BODY MASS INDEX: 35.71 KG/M2 | SYSTOLIC BLOOD PRESSURE: 115 MMHG | RESPIRATION RATE: 18 BRPM | TEMPERATURE: 98.1 F | WEIGHT: 228 LBS | HEART RATE: 84 BPM

## 2018-01-01 VITALS
DIASTOLIC BLOOD PRESSURE: 50 MMHG | SYSTOLIC BLOOD PRESSURE: 95 MMHG | OXYGEN SATURATION: 99 % | RESPIRATION RATE: 8 BRPM

## 2018-01-01 VITALS
HEART RATE: 80 BPM | TEMPERATURE: 97.7 F | WEIGHT: 226.2 LBS | RESPIRATION RATE: 19 BRPM | SYSTOLIC BLOOD PRESSURE: 92 MMHG | HEIGHT: 67 IN | OXYGEN SATURATION: 99 % | BODY MASS INDEX: 35.5 KG/M2 | DIASTOLIC BLOOD PRESSURE: 39 MMHG

## 2018-01-01 VITALS
HEART RATE: 84 BPM | TEMPERATURE: 98.2 F | HEIGHT: 67 IN | BODY MASS INDEX: 32.02 KG/M2 | RESPIRATION RATE: 18 BRPM | SYSTOLIC BLOOD PRESSURE: 120 MMHG | OXYGEN SATURATION: 99 % | WEIGHT: 204 LBS | DIASTOLIC BLOOD PRESSURE: 70 MMHG

## 2018-01-01 DIAGNOSIS — I48.91 ATRIAL FIBRILLATION, UNSPECIFIED TYPE (HCC): ICD-10-CM

## 2018-01-01 DIAGNOSIS — K76.82 HEPATIC ENCEPHALOPATHY: ICD-10-CM

## 2018-01-01 DIAGNOSIS — K70.30 ALCOHOLIC CIRRHOSIS OF LIVER WITHOUT ASCITES (HCC): ICD-10-CM

## 2018-01-01 DIAGNOSIS — R13.10 DYSPHAGIA, UNSPECIFIED TYPE: ICD-10-CM

## 2018-01-01 DIAGNOSIS — J90 PLEURAL EFFUSION: ICD-10-CM

## 2018-01-01 DIAGNOSIS — R16.0 LIVER MASS: Primary | ICD-10-CM

## 2018-01-01 DIAGNOSIS — K74.60 DIFFUSE NODULAR CIRRHOSIS OF LIVER (HCC): ICD-10-CM

## 2018-01-01 DIAGNOSIS — J40 BRONCHITIS: ICD-10-CM

## 2018-01-01 DIAGNOSIS — R53.1 GENERAL WEAKNESS: ICD-10-CM

## 2018-01-01 DIAGNOSIS — E87.1 HYPONATREMIA: ICD-10-CM

## 2018-01-01 DIAGNOSIS — I48.91 ATRIAL FIBRILLATION, UNSPECIFIED TYPE (HCC): Primary | ICD-10-CM

## 2018-01-01 DIAGNOSIS — K74.60 DIFFUSE NODULAR CIRRHOSIS OF LIVER (HCC): Primary | ICD-10-CM

## 2018-01-01 DIAGNOSIS — K70.30 ALCOHOLIC CIRRHOSIS OF LIVER WITHOUT ASCITES (HCC): Primary | ICD-10-CM

## 2018-01-01 DIAGNOSIS — R18.8 CIRRHOSIS OF LIVER WITH ASCITES, UNSPECIFIED HEPATIC CIRRHOSIS TYPE (HCC): Primary | ICD-10-CM

## 2018-01-01 DIAGNOSIS — E87.1 CHRONIC HYPONATREMIA: Primary | ICD-10-CM

## 2018-01-01 DIAGNOSIS — R16.0 LIVER MASS: ICD-10-CM

## 2018-01-01 DIAGNOSIS — N17.9 ACUTE RENAL FAILURE, UNSPECIFIED ACUTE RENAL FAILURE TYPE (HCC): Primary | ICD-10-CM

## 2018-01-01 DIAGNOSIS — D69.6 THROMBOCYTOPENIA (HCC): Primary | ICD-10-CM

## 2018-01-01 DIAGNOSIS — E87.5 HYPERKALEMIA: ICD-10-CM

## 2018-01-01 DIAGNOSIS — K74.60 CIRRHOSIS OF LIVER WITH ASCITES, UNSPECIFIED HEPATIC CIRRHOSIS TYPE (HCC): Primary | ICD-10-CM

## 2018-01-01 DIAGNOSIS — Z79.899 HIGH RISK MEDICATION USE: ICD-10-CM

## 2018-01-01 DIAGNOSIS — R18.8 OTHER ASCITES: ICD-10-CM

## 2018-01-01 DIAGNOSIS — D64.9 ANEMIA, UNSPECIFIED TYPE: Primary | ICD-10-CM

## 2018-01-01 DIAGNOSIS — E87.1 HYPONATREMIA WITH EXCESS EXTRACELLULAR FLUID VOLUME: Primary | ICD-10-CM

## 2018-01-01 LAB
ABO/RH: NORMAL
ACANTHOCYTES: ABNORMAL
AFP: 1.1 UG/L
ALBUMIN FLUID: 1.3 G/DL
ALBUMIN SERPL-MCNC: 2.7 G/DL (ref 3.9–4.9)
ALBUMIN SERPL-MCNC: 2.8 G/DL (ref 3.9–4.9)
ALBUMIN SERPL-MCNC: 2.8 G/DL (ref 3.9–4.9)
ALBUMIN SERPL-MCNC: 2.9 G/DL (ref 3.9–4.9)
ALBUMIN SERPL-MCNC: 3 G/DL (ref 3.9–4.9)
ALBUMIN SERPL-MCNC: 3 G/DL (ref 3.9–4.9)
ALBUMIN SERPL-MCNC: 3.2 G/DL (ref 3.9–4.9)
ALP BLD-CCNC: 102 U/L (ref 35–104)
ALP BLD-CCNC: 60 U/L (ref 35–104)
ALP BLD-CCNC: 72 U/L (ref 35–104)
ALP BLD-CCNC: 75 U/L (ref 35–104)
ALP BLD-CCNC: 85 U/L (ref 35–104)
ALP BLD-CCNC: 85 U/L (ref 35–104)
ALP BLD-CCNC: 93 U/L (ref 35–104)
ALT SERPL-CCNC: 13 U/L (ref 0–41)
ALT SERPL-CCNC: 16 U/L (ref 0–41)
ALT SERPL-CCNC: 16 U/L (ref 0–41)
ALT SERPL-CCNC: 19 U/L (ref 0–41)
ALT SERPL-CCNC: 23 U/L (ref 0–41)
ALT SERPL-CCNC: 25 U/L (ref 0–41)
ALT SERPL-CCNC: 33 U/L (ref 0–41)
AMMONIA: 128 UMOL/L (ref 16–60)
AMMONIA: 74 UMOL/L (ref 16–60)
AMMONIA: 81 UMOL/L (ref 16–60)
AMPHETAMINE SCREEN, URINE: NORMAL
ANION GAP SERPL CALCULATED.3IONS-SCNC: 10 MEQ/L (ref 7–13)
ANION GAP SERPL CALCULATED.3IONS-SCNC: 11 MEQ/L (ref 7–13)
ANION GAP SERPL CALCULATED.3IONS-SCNC: 12 MEQ/L (ref 7–13)
ANION GAP SERPL CALCULATED.3IONS-SCNC: 13 MEQ/L (ref 7–13)
ANION GAP SERPL CALCULATED.3IONS-SCNC: 15 MEQ/L (ref 7–13)
ANION GAP SERPL CALCULATED.3IONS-SCNC: 16 MEQ/L (ref 7–13)
ANION GAP SERPL CALCULATED.3IONS-SCNC: 18 MEQ/L (ref 7–13)
ANION GAP SERPL CALCULATED.3IONS-SCNC: 18 MEQ/L (ref 7–13)
ANION GAP SERPL CALCULATED.3IONS-SCNC: 8 MEQ/L (ref 7–13)
ANION GAP SERPL CALCULATED.3IONS-SCNC: 8 MEQ/L (ref 7–13)
ANION GAP SERPL CALCULATED.3IONS-SCNC: 9 MEQ/L (ref 7–13)
ANISOCYTOSIS: ABNORMAL
ANTIBODY SCREEN: NORMAL
APPEARANCE FLUID: CLEAR
APPEARANCE FLUID: CLEAR
APPEARANCE FLUID: NORMAL
APTT: 61.9 SEC (ref 21.6–35.4)
AST SERPL-CCNC: 28 U/L (ref 0–40)
AST SERPL-CCNC: 32 U/L (ref 0–40)
AST SERPL-CCNC: 37 U/L (ref 0–40)
AST SERPL-CCNC: 38 U/L (ref 0–40)
AST SERPL-CCNC: 41 U/L (ref 0–40)
AST SERPL-CCNC: 42 U/L (ref 0–40)
AST SERPL-CCNC: 43 U/L (ref 0–40)
BANDED NEUTROPHILS RELATIVE PERCENT: 2 %
BANDED NEUTROPHILS RELATIVE PERCENT: 2 %
BARBITURATE SCREEN URINE: NORMAL
BASOPHILS ABSOLUTE: 0 K/UL (ref 0–0.2)
BASOPHILS ABSOLUTE: 0.1 K/UL (ref 0–0.2)
BASOPHILS RELATIVE PERCENT: 0.7 %
BASOPHILS RELATIVE PERCENT: 1 %
BASOPHILS RELATIVE PERCENT: 1.3 %
BASOPHILS RELATIVE PERCENT: 1.4 %
BASOPHILS RELATIVE PERCENT: 1.5 %
BASOPHILS RELATIVE PERCENT: 1.7 %
BASOPHILS RELATIVE PERCENT: 2 %
BENZODIAZEPINE SCREEN, URINE: NORMAL
BILIRUB SERPL-MCNC: 11.5 MG/DL (ref 0–1.2)
BILIRUB SERPL-MCNC: 2.8 MG/DL (ref 0–1.2)
BILIRUB SERPL-MCNC: 2.9 MG/DL (ref 0–1.2)
BILIRUB SERPL-MCNC: 4.7 MG/DL (ref 0–1.2)
BILIRUB SERPL-MCNC: 5.9 MG/DL (ref 0–1.2)
BILIRUB SERPL-MCNC: 6.3 MG/DL (ref 0–1.2)
BILIRUB SERPL-MCNC: 6.7 MG/DL (ref 0–1.2)
BILIRUBIN URINE: ABNORMAL
BLOOD BANK DISPENSE STATUS: NORMAL
BLOOD BANK PRODUCT CODE: NORMAL
BLOOD, URINE: NEGATIVE
BODY FLUID CULTURE, STERILE: NORMAL
BODY FLUID CULTURE, STERILE: NORMAL
BPU ID: NORMAL
BUN BLDV-MCNC: 102 MG/DL (ref 8–23)
BUN BLDV-MCNC: 107 MG/DL (ref 8–23)
BUN BLDV-MCNC: 17 MG/DL (ref 8–23)
BUN BLDV-MCNC: 18 MG/DL (ref 8–23)
BUN BLDV-MCNC: 19 MG/DL (ref 8–23)
BUN BLDV-MCNC: 20 MG/DL (ref 8–23)
BUN BLDV-MCNC: 21 MG/DL (ref 8–23)
BUN BLDV-MCNC: 22 MG/DL (ref 8–23)
BUN BLDV-MCNC: 23 MG/DL (ref 8–23)
BUN BLDV-MCNC: 25 MG/DL (ref 8–23)
BUN BLDV-MCNC: 28 MG/DL (ref 8–23)
BUN BLDV-MCNC: 29 MG/DL (ref 8–23)
BUN BLDV-MCNC: 34 MG/DL (ref 8–23)
BUN BLDV-MCNC: 36 MG/DL (ref 8–23)
BUN BLDV-MCNC: 39 MG/DL (ref 8–23)
BUN BLDV-MCNC: 45 MG/DL (ref 8–23)
BUN BLDV-MCNC: 45 MG/DL (ref 8–23)
BUN BLDV-MCNC: 48 MG/DL (ref 8–23)
BURR CELLS: ABNORMAL
BURR CELLS: ABNORMAL
CALCIUM SERPL-MCNC: 7.7 MG/DL (ref 8.6–10.2)
CALCIUM SERPL-MCNC: 7.7 MG/DL (ref 8.6–10.2)
CALCIUM SERPL-MCNC: 7.8 MG/DL (ref 8.6–10.2)
CALCIUM SERPL-MCNC: 7.8 MG/DL (ref 8.6–10.2)
CALCIUM SERPL-MCNC: 7.9 MG/DL (ref 8.6–10.2)
CALCIUM SERPL-MCNC: 8 MG/DL (ref 8.6–10.2)
CALCIUM SERPL-MCNC: 8.1 MG/DL (ref 8.6–10.2)
CALCIUM SERPL-MCNC: 8.1 MG/DL (ref 8.6–10.2)
CALCIUM SERPL-MCNC: 8.2 MG/DL (ref 8.6–10.2)
CALCIUM SERPL-MCNC: 8.3 MG/DL (ref 8.6–10.2)
CALCIUM SERPL-MCNC: 8.4 MG/DL (ref 8.6–10.2)
CALCIUM SERPL-MCNC: 8.5 MG/DL (ref 8.6–10.2)
CALCIUM SERPL-MCNC: 8.6 MG/DL (ref 8.6–10.2)
CALCIUM SERPL-MCNC: 8.7 MG/DL (ref 8.6–10.2)
CANNABINOID SCREEN URINE: NORMAL
CEA: 1.7 NG/ML (ref 0–5.5)
CELL COUNT FLUID TYPE: NORMAL
CELL COUNT FLUID TYPE: NORMAL
CHLORIDE BLD-SCNC: 80 MEQ/L (ref 98–107)
CHLORIDE BLD-SCNC: 81 MEQ/L (ref 98–107)
CHLORIDE BLD-SCNC: 82 MEQ/L (ref 98–107)
CHLORIDE BLD-SCNC: 83 MEQ/L (ref 98–107)
CHLORIDE BLD-SCNC: 84 MEQ/L (ref 98–107)
CHLORIDE BLD-SCNC: 85 MEQ/L (ref 98–107)
CHLORIDE BLD-SCNC: 85 MEQ/L (ref 98–107)
CHLORIDE BLD-SCNC: 87 MEQ/L (ref 98–107)
CHLORIDE BLD-SCNC: 88 MEQ/L (ref 98–107)
CHLORIDE BLD-SCNC: 89 MEQ/L (ref 98–107)
CHLORIDE BLD-SCNC: 90 MEQ/L (ref 98–107)
CHLORIDE BLD-SCNC: 91 MEQ/L (ref 98–107)
CHLORIDE BLD-SCNC: 97 MEQ/L (ref 98–107)
CHLORIDE BLD-SCNC: 98 MEQ/L (ref 98–107)
CHLORIDE BLD-SCNC: 99 MEQ/L (ref 98–107)
CHP ED QC CHECK: YES
CLARITY: ABNORMAL
CLOT EVALUATION: NORMAL
CLOT EVALUATION: NORMAL
CO2: 15 MEQ/L (ref 22–29)
CO2: 18 MEQ/L (ref 22–29)
CO2: 19 MEQ/L (ref 22–29)
CO2: 19 MEQ/L (ref 22–29)
CO2: 20 MEQ/L (ref 22–29)
CO2: 20 MEQ/L (ref 22–29)
CO2: 21 MEQ/L (ref 22–29)
CO2: 23 MEQ/L (ref 22–29)
CO2: 23 MEQ/L (ref 22–29)
CO2: 24 MEQ/L (ref 22–29)
CO2: 25 MEQ/L (ref 22–29)
CO2: 26 MEQ/L (ref 22–29)
CO2: 27 MEQ/L (ref 22–29)
CO2: 27 MEQ/L (ref 22–29)
CO2: 28 MEQ/L (ref 22–29)
CO2: 28 MEQ/L (ref 22–29)
CO2: 29 MEQ/L (ref 22–29)
CO2: 29 MEQ/L (ref 22–29)
COCAINE METABOLITE SCREEN URINE: NORMAL
COLOR FLUID: YELLOW
COLOR FLUID: YELLOW
COLOR: ABNORMAL
CREAT SERPL-MCNC: 0.66 MG/DL (ref 0.7–1.2)
CREAT SERPL-MCNC: 0.78 MG/DL (ref 0.7–1.2)
CREAT SERPL-MCNC: 0.78 MG/DL (ref 0.7–1.2)
CREAT SERPL-MCNC: 0.8 MG/DL (ref 0.7–1.2)
CREAT SERPL-MCNC: 0.83 MG/DL (ref 0.7–1.2)
CREAT SERPL-MCNC: 0.85 MG/DL (ref 0.7–1.2)
CREAT SERPL-MCNC: 0.86 MG/DL (ref 0.7–1.2)
CREAT SERPL-MCNC: 0.87 MG/DL (ref 0.7–1.2)
CREAT SERPL-MCNC: 0.88 MG/DL (ref 0.7–1.2)
CREAT SERPL-MCNC: 0.88 MG/DL (ref 0.7–1.2)
CREAT SERPL-MCNC: 0.89 MG/DL (ref 0.7–1.2)
CREAT SERPL-MCNC: 0.89 MG/DL (ref 0.7–1.2)
CREAT SERPL-MCNC: 0.9 MG/DL (ref 0.7–1.2)
CREAT SERPL-MCNC: 0.91 MG/DL (ref 0.7–1.2)
CREAT SERPL-MCNC: 0.93 MG/DL (ref 0.7–1.2)
CREAT SERPL-MCNC: 0.95 MG/DL (ref 0.7–1.2)
CREAT SERPL-MCNC: 0.96 MG/DL (ref 0.7–1.2)
CREAT SERPL-MCNC: 0.97 MG/DL (ref 0.7–1.2)
CREAT SERPL-MCNC: 1 MG/DL (ref 0.7–1.2)
CREAT SERPL-MCNC: 1.02 MG/DL (ref 0.7–1.2)
CREAT SERPL-MCNC: 1.03 MG/DL (ref 0.7–1.2)
CREAT SERPL-MCNC: 1.04 MG/DL (ref 0.7–1.2)
CREAT SERPL-MCNC: 1.05 MG/DL (ref 0.7–1.2)
CREAT SERPL-MCNC: 1.35 MG/DL (ref 0.7–1.2)
CREAT SERPL-MCNC: 2.35 MG/DL (ref 0.7–1.2)
CREAT SERPL-MCNC: 2.41 MG/DL (ref 0.7–1.2)
CREAT SERPL-MCNC: 2.56 MG/DL (ref 0.7–1.2)
CREAT SERPL-MCNC: 2.69 MG/DL (ref 0.7–1.2)
CREATININE URINE: 76.3 MG/DL
DESCRIPTION BLOOD BANK: NORMAL
EKG ATRIAL RATE: 68 BPM
EKG Q-T INTERVAL: 410 MS
EKG QRS DURATION: 144 MS
EKG QTC CALCULATION (BAZETT): 445 MS
EKG R AXIS: 180 DEGREES
EKG T AXIS: 14 DEGREES
EKG VENTRICULAR RATE: 71 BPM
EOSINOPHIL FLUID: 1 %
EOSINOPHILS ABSOLUTE: 0.1 K/UL (ref 0–0.7)
EOSINOPHILS ABSOLUTE: 0.2 K/UL (ref 0–0.7)
EOSINOPHILS RELATIVE PERCENT: 1 %
EOSINOPHILS RELATIVE PERCENT: 2 %
EOSINOPHILS RELATIVE PERCENT: 2.7 %
EOSINOPHILS RELATIVE PERCENT: 2.8 %
EOSINOPHILS RELATIVE PERCENT: 3 %
EOSINOPHILS RELATIVE PERCENT: 3 %
EOSINOPHILS RELATIVE PERCENT: 4.2 %
EOSINOPHILS RELATIVE PERCENT: 4.3 %
EOSINOPHILS RELATIVE PERCENT: 4.8 %
ETHANOL PERCENT: NORMAL G/DL
ETHANOL: <10 MG/DL (ref 0–0.08)
FERRITIN: 72.4 NG/ML (ref 30–400)
FLUID TYPE: NORMAL
FLUID TYPE: NORMAL
GFR AFRICAN AMERICAN: 23
GFR AFRICAN AMERICAN: 28.1
GFR AFRICAN AMERICAN: 29.8
GFR AFRICAN AMERICAN: 31.9
GFR AFRICAN AMERICAN: 32.9
GFR AFRICAN AMERICAN: >60
GFR NON-AFRICAN AMERICAN: 19
GFR NON-AFRICAN AMERICAN: 23.3
GFR NON-AFRICAN AMERICAN: 24.6
GFR NON-AFRICAN AMERICAN: 26.4
GFR NON-AFRICAN AMERICAN: 27.2
GFR NON-AFRICAN AMERICAN: 51.5
GFR NON-AFRICAN AMERICAN: >60
GLOBULIN: 2 G/DL (ref 2.3–3.5)
GLOBULIN: 2.1 G/DL (ref 2.3–3.5)
GLOBULIN: 2.1 G/DL (ref 2.3–3.5)
GLOBULIN: 2.2 G/DL (ref 2.3–3.5)
GLOBULIN: 2.4 G/DL (ref 2.3–3.5)
GLOBULIN: 2.5 G/DL (ref 2.3–3.5)
GLOBULIN: 2.5 G/DL (ref 2.3–3.5)
GLUCOSE BLD-MCNC: 100 MG/DL
GLUCOSE BLD-MCNC: 100 MG/DL (ref 60–115)
GLUCOSE BLD-MCNC: 100 MG/DL (ref 60–115)
GLUCOSE BLD-MCNC: 101 MG/DL (ref 74–109)
GLUCOSE BLD-MCNC: 102 MG/DL (ref 74–109)
GLUCOSE BLD-MCNC: 103 MG/DL (ref 60–115)
GLUCOSE BLD-MCNC: 103 MG/DL (ref 74–109)
GLUCOSE BLD-MCNC: 103 MG/DL (ref 74–109)
GLUCOSE BLD-MCNC: 104 MG/DL (ref 74–109)
GLUCOSE BLD-MCNC: 109 MG/DL (ref 60–115)
GLUCOSE BLD-MCNC: 109 MG/DL (ref 60–115)
GLUCOSE BLD-MCNC: 112 MG/DL (ref 74–109)
GLUCOSE BLD-MCNC: 113 MG/DL (ref 60–115)
GLUCOSE BLD-MCNC: 113 MG/DL (ref 74–109)
GLUCOSE BLD-MCNC: 119 MG/DL (ref 74–109)
GLUCOSE BLD-MCNC: 120 MG/DL (ref 60–115)
GLUCOSE BLD-MCNC: 120 MG/DL (ref 60–115)
GLUCOSE BLD-MCNC: 121 MG/DL (ref 60–115)
GLUCOSE BLD-MCNC: 124 MG/DL (ref 60–115)
GLUCOSE BLD-MCNC: 125 MG/DL (ref 60–115)
GLUCOSE BLD-MCNC: 126 MG/DL (ref 74–109)
GLUCOSE BLD-MCNC: 127 MG/DL (ref 74–109)
GLUCOSE BLD-MCNC: 129 MG/DL (ref 60–115)
GLUCOSE BLD-MCNC: 129 MG/DL (ref 74–109)
GLUCOSE BLD-MCNC: 131 MG/DL (ref 60–115)
GLUCOSE BLD-MCNC: 131 MG/DL (ref 74–109)
GLUCOSE BLD-MCNC: 132 MG/DL (ref 60–115)
GLUCOSE BLD-MCNC: 133 MG/DL (ref 60–115)
GLUCOSE BLD-MCNC: 136 MG/DL (ref 60–115)
GLUCOSE BLD-MCNC: 137 MG/DL (ref 60–115)
GLUCOSE BLD-MCNC: 138 MG/DL (ref 60–115)
GLUCOSE BLD-MCNC: 146 MG/DL (ref 60–115)
GLUCOSE BLD-MCNC: 147 MG/DL (ref 60–115)
GLUCOSE BLD-MCNC: 147 MG/DL (ref 74–109)
GLUCOSE BLD-MCNC: 152 MG/DL (ref 60–115)
GLUCOSE BLD-MCNC: 155 MG/DL (ref 60–115)
GLUCOSE BLD-MCNC: 157 MG/DL (ref 60–115)
GLUCOSE BLD-MCNC: 159 MG/DL (ref 60–115)
GLUCOSE BLD-MCNC: 159 MG/DL (ref 74–109)
GLUCOSE BLD-MCNC: 160 MG/DL (ref 60–115)
GLUCOSE BLD-MCNC: 163 MG/DL (ref 60–115)
GLUCOSE BLD-MCNC: 165 MG/DL (ref 60–115)
GLUCOSE BLD-MCNC: 170 MG/DL (ref 60–115)
GLUCOSE BLD-MCNC: 171 MG/DL (ref 74–109)
GLUCOSE BLD-MCNC: 172 MG/DL (ref 60–115)
GLUCOSE BLD-MCNC: 173 MG/DL (ref 60–115)
GLUCOSE BLD-MCNC: 177 MG/DL (ref 74–109)
GLUCOSE BLD-MCNC: 179 MG/DL (ref 60–115)
GLUCOSE BLD-MCNC: 180 MG/DL (ref 60–115)
GLUCOSE BLD-MCNC: 180 MG/DL (ref 60–115)
GLUCOSE BLD-MCNC: 181 MG/DL (ref 60–115)
GLUCOSE BLD-MCNC: 183 MG/DL (ref 60–115)
GLUCOSE BLD-MCNC: 186 MG/DL (ref 60–115)
GLUCOSE BLD-MCNC: 188 MG/DL (ref 60–115)
GLUCOSE BLD-MCNC: 188 MG/DL (ref 60–115)
GLUCOSE BLD-MCNC: 191 MG/DL (ref 60–115)
GLUCOSE BLD-MCNC: 192 MG/DL (ref 74–109)
GLUCOSE BLD-MCNC: 195 MG/DL (ref 60–115)
GLUCOSE BLD-MCNC: 195 MG/DL (ref 60–115)
GLUCOSE BLD-MCNC: 196 MG/DL (ref 60–115)
GLUCOSE BLD-MCNC: 197 MG/DL (ref 60–115)
GLUCOSE BLD-MCNC: 199 MG/DL (ref 60–115)
GLUCOSE BLD-MCNC: 200 MG/DL (ref 60–115)
GLUCOSE BLD-MCNC: 206 MG/DL (ref 60–115)
GLUCOSE BLD-MCNC: 207 MG/DL (ref 60–115)
GLUCOSE BLD-MCNC: 207 MG/DL (ref 60–115)
GLUCOSE BLD-MCNC: 208 MG/DL (ref 60–115)
GLUCOSE BLD-MCNC: 210 MG/DL (ref 60–115)
GLUCOSE BLD-MCNC: 211 MG/DL (ref 60–115)
GLUCOSE BLD-MCNC: 211 MG/DL (ref 60–115)
GLUCOSE BLD-MCNC: 213 MG/DL (ref 74–109)
GLUCOSE BLD-MCNC: 215 MG/DL (ref 60–115)
GLUCOSE BLD-MCNC: 220 MG/DL (ref 60–115)
GLUCOSE BLD-MCNC: 220 MG/DL (ref 60–115)
GLUCOSE BLD-MCNC: 255 MG/DL (ref 60–115)
GLUCOSE BLD-MCNC: 269 MG/DL (ref 74–109)
GLUCOSE BLD-MCNC: 297 MG/DL (ref 60–115)
GLUCOSE BLD-MCNC: 329 MG/DL (ref 60–115)
GLUCOSE BLD-MCNC: 337 MG/DL (ref 60–115)
GLUCOSE BLD-MCNC: 365 MG/DL (ref 60–115)
GLUCOSE BLD-MCNC: 71 MG/DL (ref 74–109)
GLUCOSE BLD-MCNC: 75 MG/DL (ref 60–115)
GLUCOSE BLD-MCNC: 79 MG/DL (ref 74–109)
GLUCOSE BLD-MCNC: 83 MG/DL (ref 74–109)
GLUCOSE BLD-MCNC: 84 MG/DL (ref 74–109)
GLUCOSE BLD-MCNC: 90 MG/DL (ref 60–115)
GLUCOSE BLD-MCNC: 93 MG/DL (ref 74–109)
GLUCOSE BLD-MCNC: 93 MG/DL (ref 74–109)
GLUCOSE BLD-MCNC: 99 MG/DL (ref 60–115)
GLUCOSE URINE: NEGATIVE MG/DL
GLUCOSE, FLUID: 142 MG/DL
GLUCOSE, FLUID: 189 MG/DL
GRAM STAIN RESULT: NORMAL
GRAM STAIN RESULT: NORMAL
HBA1C MFR BLD: 5.5 % (ref 4.8–5.9)
HCT VFR BLD CALC: 21.1 % (ref 42–52)
HCT VFR BLD CALC: 22.9 % (ref 42–52)
HCT VFR BLD CALC: 23.2 % (ref 42–52)
HCT VFR BLD CALC: 23.2 % (ref 42–52)
HCT VFR BLD CALC: 23.4 % (ref 42–52)
HCT VFR BLD CALC: 23.8 % (ref 42–52)
HCT VFR BLD CALC: 24.3 % (ref 42–52)
HCT VFR BLD CALC: 24.4 % (ref 42–52)
HCT VFR BLD CALC: 25 % (ref 42–52)
HCT VFR BLD CALC: 25 % (ref 42–52)
HCT VFR BLD CALC: 25.1 % (ref 42–52)
HCT VFR BLD CALC: 25.2 % (ref 42–52)
HCT VFR BLD CALC: 25.7 % (ref 42–52)
HCT VFR BLD CALC: 26.4 % (ref 42–52)
HCT VFR BLD CALC: 26.4 % (ref 42–52)
HCT VFR BLD CALC: 26.5 % (ref 42–52)
HCT VFR BLD CALC: 27 % (ref 42–52)
HCT VFR BLD CALC: 27.2 % (ref 42–52)
HCT VFR BLD CALC: 27.5 % (ref 42–52)
HCT VFR BLD CALC: 29.7 % (ref 42–52)
HEMATOLOGY PATH CONSULT: NORMAL
HEMATOLOGY PATH CONSULT: YES
HEMOGLOBIN: 7 G/DL (ref 14–18)
HEMOGLOBIN: 7.7 G/DL (ref 14–18)
HEMOGLOBIN: 7.7 G/DL (ref 14–18)
HEMOGLOBIN: 7.8 G/DL (ref 14–18)
HEMOGLOBIN: 7.9 G/DL (ref 14–18)
HEMOGLOBIN: 8 G/DL (ref 14–18)
HEMOGLOBIN: 8.1 G/DL (ref 14–18)
HEMOGLOBIN: 8.2 G/DL (ref 14–18)
HEMOGLOBIN: 8.3 G/DL (ref 14–18)
HEMOGLOBIN: 8.4 G/DL (ref 14–18)
HEMOGLOBIN: 8.6 G/DL (ref 14–18)
HEMOGLOBIN: 8.8 G/DL (ref 14–18)
HEMOGLOBIN: 9 G/DL (ref 14–18)
HEMOGLOBIN: 9.3 G/DL (ref 14–18)
HEMOGLOBIN: 9.9 G/DL (ref 14–18)
HYPOCHROMIA: ABNORMAL
INR BLD: 1.8
INR BLD: 1.9
INR BLD: 2
INR BLD: 2
INR BLD: 2.1
INR BLD: 2.2
INR BLD: 2.2
INR BLD: 2.3
INR BLD: 2.3
INR BLD: 2.5
INR BLD: 2.6
INR BLD: 2.7
INR BLD: 2.8
INR BLD: 2.8
INR BLD: 3
INR BLD: 3.2
INR BLD: 3.4
INR BLD: 3.6
INR BLD: 3.9
INR BLD: 4
INR BLD: 4.3
INR BLD: 4.4
INR BLD: 4.8
IRON SATURATION: 12 % (ref 11–46)
IRON SATURATION: 16 % (ref 11–46)
IRON: 35 UG/DL (ref 59–158)
IRON: 36 UG/DL (ref 59–158)
KETONES, URINE: NEGATIVE MG/DL
LACTIC ACID: 2.5 MMOL/L (ref 0.5–2.2)
LD, FLUID: 50 U/L
LD, FLUID: 61 U/L
LEUKOCYTE ESTERASE, URINE: NEGATIVE
LIPASE: 107 U/L (ref 13–60)
LYMPHOCYTES ABSOLUTE: 0.2 K/UL (ref 1–4.8)
LYMPHOCYTES ABSOLUTE: 0.6 K/UL (ref 1–4.8)
LYMPHOCYTES ABSOLUTE: 0.7 K/UL (ref 1–4.8)
LYMPHOCYTES ABSOLUTE: 0.8 K/UL (ref 1–4.8)
LYMPHOCYTES ABSOLUTE: 0.8 K/UL (ref 1–4.8)
LYMPHOCYTES ABSOLUTE: 0.9 K/UL (ref 1–4.8)
LYMPHOCYTES ABSOLUTE: 1 K/UL (ref 1–4.8)
LYMPHOCYTES RELATIVE PERCENT: 13 %
LYMPHOCYTES RELATIVE PERCENT: 13 %
LYMPHOCYTES RELATIVE PERCENT: 13.2 %
LYMPHOCYTES RELATIVE PERCENT: 15.8 %
LYMPHOCYTES RELATIVE PERCENT: 18.3 %
LYMPHOCYTES RELATIVE PERCENT: 19 %
LYMPHOCYTES RELATIVE PERCENT: 20.9 %
LYMPHOCYTES RELATIVE PERCENT: 21.4 %
LYMPHOCYTES RELATIVE PERCENT: 4 %
LYMPHOCYTES, BODY FLUID: 60 %
LYMPHOCYTES, BODY FLUID: 71 %
Lab: NORMAL
MACROCYTES: ABNORMAL
MACROCYTES: ABNORMAL
MAGNESIUM: 2.1 MG/DL (ref 1.7–2.3)
MAGNESIUM: 2.2 MG/DL (ref 1.7–2.3)
MCH RBC QN AUTO: 26.4 PG (ref 27–31.3)
MCH RBC QN AUTO: 26.4 PG (ref 27–31.3)
MCH RBC QN AUTO: 26.6 PG (ref 27–31.3)
MCH RBC QN AUTO: 26.9 PG (ref 27–31.3)
MCH RBC QN AUTO: 27.3 PG (ref 27–31.3)
MCH RBC QN AUTO: 28.4 PG (ref 27–31.3)
MCH RBC QN AUTO: 28.5 PG (ref 27–31.3)
MCH RBC QN AUTO: 28.9 PG (ref 27–31.3)
MCH RBC QN AUTO: 29.8 PG (ref 27–31.3)
MCHC RBC AUTO-ENTMCNC: 32.7 % (ref 33–37)
MCHC RBC AUTO-ENTMCNC: 32.7 % (ref 33–37)
MCHC RBC AUTO-ENTMCNC: 33.2 % (ref 33–37)
MCHC RBC AUTO-ENTMCNC: 33.2 % (ref 33–37)
MCHC RBC AUTO-ENTMCNC: 33.4 % (ref 33–37)
MCHC RBC AUTO-ENTMCNC: 33.4 % (ref 33–37)
MCHC RBC AUTO-ENTMCNC: 33.5 % (ref 33–37)
MCHC RBC AUTO-ENTMCNC: 33.9 % (ref 33–37)
MCHC RBC AUTO-ENTMCNC: 33.9 % (ref 33–37)
MCV RBC AUTO: 79.6 FL (ref 80–100)
MCV RBC AUTO: 80.4 FL (ref 80–100)
MCV RBC AUTO: 80.6 FL (ref 80–100)
MCV RBC AUTO: 80.9 FL (ref 80–100)
MCV RBC AUTO: 80.9 FL (ref 80–100)
MCV RBC AUTO: 84.9 FL (ref 80–100)
MCV RBC AUTO: 85.1 FL (ref 80–100)
MCV RBC AUTO: 85.9 FL (ref 80–100)
MCV RBC AUTO: 88.9 FL (ref 80–100)
MISCELLANEOUS LAB TEST ORDER: NORMAL
MISCELLANEOUS LAB TEST RESULT: ABNORMAL
MISCELLANEOUS PROMPT 2: ABNORMAL
MONOCYTE, FLUID: 16 %
MONOCYTE, FLUID: 8 %
MONOCYTES ABSOLUTE: 0.1 K/UL (ref 0.2–0.8)
MONOCYTES ABSOLUTE: 0.1 K/UL (ref 0.2–0.8)
MONOCYTES ABSOLUTE: 0.3 K/UL (ref 0.2–0.8)
MONOCYTES ABSOLUTE: 0.4 K/UL (ref 0.2–0.8)
MONOCYTES ABSOLUTE: 0.4 K/UL (ref 0.2–0.8)
MONOCYTES ABSOLUTE: 0.5 K/UL (ref 0.2–0.8)
MONOCYTES ABSOLUTE: 0.5 K/UL (ref 0.2–0.8)
MONOCYTES ABSOLUTE: 0.6 K/UL (ref 0.2–0.8)
MONOCYTES ABSOLUTE: 0.7 K/UL (ref 0.2–0.8)
MONOCYTES RELATIVE PERCENT: 1 %
MONOCYTES RELATIVE PERCENT: 10.7 %
MONOCYTES RELATIVE PERCENT: 11.9 %
MONOCYTES RELATIVE PERCENT: 12.8 %
MONOCYTES RELATIVE PERCENT: 2.9 %
MONOCYTES RELATIVE PERCENT: 7.8 %
MONOCYTES RELATIVE PERCENT: 8.1 %
MONOCYTES RELATIVE PERCENT: 8.5 %
MONOCYTES RELATIVE PERCENT: 8.8 %
NEUTROPHIL, FLUID: 20 %
NEUTROPHIL, FLUID: 24 %
NEUTROPHILS ABSOLUTE: 2.8 K/UL (ref 1.4–6.5)
NEUTROPHILS ABSOLUTE: 2.9 K/UL (ref 1.4–6.5)
NEUTROPHILS ABSOLUTE: 3.4 K/UL (ref 1.4–6.5)
NEUTROPHILS ABSOLUTE: 3.4 K/UL (ref 1.4–6.5)
NEUTROPHILS ABSOLUTE: 4 K/UL (ref 1.4–6.5)
NEUTROPHILS ABSOLUTE: 4.1 K/UL (ref 1.4–6.5)
NEUTROPHILS ABSOLUTE: 5.6 K/UL (ref 1.4–6.5)
NEUTROPHILS RELATIVE PERCENT: 64.5 %
NEUTROPHILS RELATIVE PERCENT: 64.7 %
NEUTROPHILS RELATIVE PERCENT: 65.8 %
NEUTROPHILS RELATIVE PERCENT: 67.3 %
NEUTROPHILS RELATIVE PERCENT: 72 %
NEUTROPHILS RELATIVE PERCENT: 72.1 %
NEUTROPHILS RELATIVE PERCENT: 73.8 %
NEUTROPHILS RELATIVE PERCENT: 73.9 %
NEUTROPHILS RELATIVE PERCENT: 92 %
NITRITE, URINE: NEGATIVE
NUCLEATED CELLS FLUID: 181 /CUMM
NUCLEATED CELLS FLUID: 96 /CUMM
NUMBER OF CELLS COUNTED FLUID: 100
NUMBER OF CELLS COUNTED FLUID: 100
OPIATE SCREEN URINE: NORMAL
OSMOLALITY URINE: 241 MOSM/KG (ref 300–900)
OSMOLALITY: 250 MOSM/KG (ref 280–303)
OVALOCYTES: ABNORMAL
PDW BLD-RTO: 18.5 % (ref 11.5–14.5)
PDW BLD-RTO: 18.7 % (ref 11.5–14.5)
PDW BLD-RTO: 19.1 % (ref 11.5–14.5)
PDW BLD-RTO: 21.1 % (ref 11.5–14.5)
PDW BLD-RTO: 22.6 % (ref 11.5–14.5)
PDW BLD-RTO: 24.5 % (ref 11.5–14.5)
PDW BLD-RTO: 25.2 % (ref 11.5–14.5)
PDW BLD-RTO: 25.3 % (ref 11.5–14.5)
PDW BLD-RTO: 25.4 % (ref 11.5–14.5)
PERFORMED ON: ABNORMAL
PERFORMED ON: NORMAL
PH UA: 5 (ref 5–9)
PHENCYCLIDINE SCREEN URINE: NORMAL
PLATELET # BLD: 100 K/UL (ref 130–400)
PLATELET # BLD: 110 K/UL (ref 130–400)
PLATELET # BLD: 123 K/UL (ref 130–400)
PLATELET # BLD: 130 K/UL (ref 130–400)
PLATELET # BLD: 41 K/UL (ref 130–400)
PLATELET # BLD: 54 K/UL (ref 130–400)
PLATELET # BLD: 77 K/UL (ref 130–400)
PLATELET # BLD: 78 K/UL (ref 130–400)
PLATELET # BLD: 92 K/UL (ref 130–400)
PLATELET SLIDE REVIEW: ABNORMAL
POC CREATININE WHOLE BLOOD: 3.2
POC CREATININE: 3.2 MG/DL (ref 0.8–1.3)
POC SAMPLE TYPE: ABNORMAL
POIKILOCYTES: ABNORMAL
POTASSIUM SERPL-SCNC: 4.1 MEQ/L (ref 3.5–5.1)
POTASSIUM SERPL-SCNC: 4.2 MEQ/L (ref 3.5–5.1)
POTASSIUM SERPL-SCNC: 4.5 MEQ/L (ref 3.5–5.1)
POTASSIUM SERPL-SCNC: 4.6 MEQ/L (ref 3.5–5.1)
POTASSIUM SERPL-SCNC: 4.7 MEQ/L (ref 3.5–5.1)
POTASSIUM SERPL-SCNC: 4.8 MEQ/L (ref 3.5–5.1)
POTASSIUM SERPL-SCNC: 4.9 MEQ/L (ref 3.5–5.1)
POTASSIUM SERPL-SCNC: 4.9 MEQ/L (ref 3.5–5.1)
POTASSIUM SERPL-SCNC: 5 MEQ/L (ref 3.5–5.1)
POTASSIUM SERPL-SCNC: 5.1 MEQ/L (ref 3.5–5.1)
POTASSIUM SERPL-SCNC: 5.2 MEQ/L (ref 3.5–5.1)
POTASSIUM SERPL-SCNC: 5.3 MEQ/L (ref 3.5–5.1)
POTASSIUM SERPL-SCNC: 5.3 MEQ/L (ref 3.5–5.1)
POTASSIUM SERPL-SCNC: 5.5 MEQ/L (ref 3.5–5.1)
POTASSIUM SERPL-SCNC: 5.5 MEQ/L (ref 3.5–5.1)
POTASSIUM SERPL-SCNC: 6.2 MEQ/L (ref 3.5–5.1)
POTASSIUM SERPL-SCNC: 6.4 MEQ/L (ref 3.5–5.1)
POTASSIUM SERPL-SCNC: 6.6 MEQ/L (ref 3.5–5.1)
POTASSIUM SERPL-SCNC: 7.2 MEQ/L (ref 3.5–5.1)
PROTEIN FLUID: 1.5 G/DL
PROTEIN FLUID: 2 G/DL
PROTEIN UA: NEGATIVE MG/DL
PROTHROMBIN TIME: 19 SEC (ref 9.6–12.3)
PROTHROMBIN TIME: 19.4 SEC (ref 9.6–12.3)
PROTHROMBIN TIME: 19.6 SEC (ref 9.6–12.3)
PROTHROMBIN TIME: 19.9 SEC (ref 9.6–12.3)
PROTHROMBIN TIME: 20.1 SEC (ref 9.6–12.3)
PROTHROMBIN TIME: 21.2 SEC (ref 9.6–12.3)
PROTHROMBIN TIME: 21.4 SEC (ref 9.6–12.3)
PROTHROMBIN TIME: 22.1 SEC (ref 9.6–12.3)
PROTHROMBIN TIME: 22.5 SEC (ref 9.6–12.3)
PROTHROMBIN TIME: 22.7 SEC (ref 9.6–12.3)
PROTHROMBIN TIME: 24.1 SEC (ref 9.6–12.3)
PROTHROMBIN TIME: 26.7 SEC (ref 9.6–12.3)
PROTHROMBIN TIME: 29.4 SEC (ref 9.6–12.3)
PROTHROMBIN TIME: 32 SEC (ref 9.6–12.3)
PROTHROMBIN TIME: 33.7 SEC (ref 9.6–12.3)
PROTHROMBIN TIME: 36.5 SEC (ref 9.6–12.3)
PROTHROMBIN TIME: 41 SEC (ref 9.6–12.3)
PROTHROMBIN TIME: 45.9 SEC (ref 9.6–12.3)
PROTHROMBIN TIME: 47 SEC (ref 9.6–12.3)
PROTIME: 21.1 SECONDS
PROTIME: 21.4 SECONDS
PROTIME: 26 SECONDS
PROTIME: 27.8 SECONDS
PROTIME: 31.3 SECONDS
PROTIME: 32.7 SECONDS
PROTIME: 34.1 SECONDS
PROTIME: 43.2 SECONDS
PROTIME: 47.6 SECONDS
PROTIME: 58.1 SECONDS
RBC # BLD: 2.65 M/UL (ref 4.7–6.1)
RBC # BLD: 2.78 M/UL (ref 4.7–6.1)
RBC # BLD: 3.02 M/UL (ref 4.7–6.1)
RBC # BLD: 3.1 M/UL (ref 4.7–6.1)
RBC # BLD: 3.18 M/UL (ref 4.7–6.1)
RBC # BLD: 3.27 M/UL (ref 4.7–6.1)
RBC # BLD: 3.28 M/UL (ref 4.7–6.1)
RBC # BLD: 3.34 M/UL (ref 4.7–6.1)
RBC # BLD: 3.4 M/UL (ref 4.7–6.1)
RBC FLUID: 1165 /CUMM
RBC FLUID: 3025 /CUMM
SCHISTOCYTES: ABNORMAL
SCHISTOCYTES: ABNORMAL
SLIDE REVIEW: ABNORMAL
SODIUM BLD-SCNC: 112 MEQ/L (ref 132–144)
SODIUM BLD-SCNC: 113 MEQ/L (ref 132–144)
SODIUM BLD-SCNC: 113 MEQ/L (ref 132–144)
SODIUM BLD-SCNC: 115 MEQ/L (ref 132–144)
SODIUM BLD-SCNC: 115 MEQ/L (ref 132–144)
SODIUM BLD-SCNC: 116 MEQ/L (ref 132–144)
SODIUM BLD-SCNC: 117 MEQ/L (ref 132–144)
SODIUM BLD-SCNC: 117 MEQ/L (ref 132–144)
SODIUM BLD-SCNC: 118 MEQ/L (ref 132–144)
SODIUM BLD-SCNC: 118 MEQ/L (ref 132–144)
SODIUM BLD-SCNC: 119 MEQ/L (ref 132–144)
SODIUM BLD-SCNC: 120 MEQ/L (ref 132–144)
SODIUM BLD-SCNC: 120 MEQ/L (ref 132–144)
SODIUM BLD-SCNC: 121 MEQ/L (ref 132–144)
SODIUM BLD-SCNC: 123 MEQ/L (ref 132–144)
SODIUM BLD-SCNC: 124 MEQ/L (ref 132–144)
SODIUM BLD-SCNC: 124 MEQ/L (ref 132–144)
SODIUM BLD-SCNC: 125 MEQ/L (ref 132–144)
SODIUM BLD-SCNC: 126 MEQ/L (ref 132–144)
SODIUM BLD-SCNC: 127 MEQ/L (ref 132–144)
SODIUM BLD-SCNC: 132 MEQ/L (ref 132–144)
SODIUM BLD-SCNC: 136 MEQ/L (ref 132–144)
SODIUM BLD-SCNC: 137 MEQ/L (ref 132–144)
SODIUM BLD-SCNC: 138 MEQ/L (ref 132–144)
SODIUM URINE: 20 MEQ/L
SPECIFIC GRAVITY UA: 1.01 (ref 1–1.03)
TARGET CELLS: ABNORMAL
TARGET CELLS: ABNORMAL
TEAR DROP CELLS: ABNORMAL
TOTAL IRON BINDING CAPACITY: 228 UG/DL (ref 178–450)
TOTAL IRON BINDING CAPACITY: 288 UG/DL (ref 178–450)
TOTAL PROTEIN: 4.8 G/DL (ref 6.4–8.1)
TOTAL PROTEIN: 4.9 G/DL (ref 6.4–8.1)
TOTAL PROTEIN: 5.2 G/DL (ref 6.4–8.1)
TOTAL PROTEIN: 5.3 G/DL (ref 6.4–8.1)
TOTAL PROTEIN: 5.5 G/DL (ref 6.4–8.1)
URINE CULTURE, ROUTINE: NORMAL
URINE REFLEX TO CULTURE: ABNORMAL
UROBILINOGEN, URINE: 1 E.U./DL
WBC # BLD: 3.9 K/UL (ref 4.8–10.8)
WBC # BLD: 4.3 K/UL (ref 4.8–10.8)
WBC # BLD: 4.5 K/UL (ref 4.8–10.8)
WBC # BLD: 4.5 K/UL (ref 4.8–10.8)
WBC # BLD: 4.6 K/UL (ref 4.8–10.8)
WBC # BLD: 5.1 K/UL (ref 4.8–10.8)
WBC # BLD: 5.5 K/UL (ref 4.8–10.8)
WBC # BLD: 5.6 K/UL (ref 4.8–10.8)
WBC # BLD: 6 K/UL (ref 4.8–10.8)
WHOPPER PROMPT: NORMAL

## 2018-01-01 PROCEDURE — 80048 BASIC METABOLIC PNL TOTAL CA: CPT

## 2018-01-01 PROCEDURE — P9046 ALBUMIN (HUMAN), 25%, 20 ML: HCPCS | Performed by: INTERNAL MEDICINE

## 2018-01-01 PROCEDURE — 89051 BODY FLUID CELL COUNT: CPT

## 2018-01-01 PROCEDURE — 2580000003 HC RX 258: Performed by: INTERNAL MEDICINE

## 2018-01-01 PROCEDURE — 99211 OFF/OP EST MAY X REQ PHY/QHP: CPT

## 2018-01-01 PROCEDURE — C9113 INJ PANTOPRAZOLE SODIUM, VIA: HCPCS | Performed by: INTERNAL MEDICINE

## 2018-01-01 PROCEDURE — 1111F DSCHRG MED/CURRENT MED MERGE: CPT | Performed by: INTERNAL MEDICINE

## 2018-01-01 PROCEDURE — P9017 PLASMA 1 DONOR FRZ W/IN 8 HR: HCPCS

## 2018-01-01 PROCEDURE — 1210000000 HC MED SURG R&B

## 2018-01-01 PROCEDURE — 1123F ACP DISCUSS/DSCN MKR DOCD: CPT | Performed by: RADIOLOGY

## 2018-01-01 PROCEDURE — 6360000002 HC RX W HCPCS: Performed by: INTERNAL MEDICINE

## 2018-01-01 PROCEDURE — 83690 ASSAY OF LIPASE: CPT

## 2018-01-01 PROCEDURE — 85014 HEMATOCRIT: CPT

## 2018-01-01 PROCEDURE — 85610 PROTHROMBIN TIME: CPT

## 2018-01-01 PROCEDURE — 82728 ASSAY OF FERRITIN: CPT

## 2018-01-01 PROCEDURE — 85018 HEMOGLOBIN: CPT

## 2018-01-01 PROCEDURE — 2060000000 HC ICU INTERMEDIATE R&B

## 2018-01-01 PROCEDURE — 85610 PROTHROMBIN TIME: CPT | Performed by: PHARMACIST

## 2018-01-01 PROCEDURE — 85025 COMPLETE CBC W/AUTO DIFF WBC: CPT

## 2018-01-01 PROCEDURE — 6360000002 HC RX W HCPCS: Performed by: NURSE ANESTHETIST, CERTIFIED REGISTERED

## 2018-01-01 PROCEDURE — G8417 CALC BMI ABV UP PARAM F/U: HCPCS | Performed by: RADIOLOGY

## 2018-01-01 PROCEDURE — 88305 TISSUE EXAM BY PATHOLOGIST: CPT

## 2018-01-01 PROCEDURE — 88112 CYTOPATH CELL ENHANCE TECH: CPT

## 2018-01-01 PROCEDURE — 36430 TRANSFUSION BLD/BLD COMPNT: CPT

## 2018-01-01 PROCEDURE — 4040F PNEUMOC VAC/ADMIN/RCVD: CPT | Performed by: RADIOLOGY

## 2018-01-01 PROCEDURE — 80053 COMPREHEN METABOLIC PANEL: CPT

## 2018-01-01 PROCEDURE — 99213 OFFICE O/P EST LOW 20 MIN: CPT | Performed by: NURSE PRACTITIONER

## 2018-01-01 PROCEDURE — 3017F COLORECTAL CA SCREEN DOC REV: CPT | Performed by: RADIOLOGY

## 2018-01-01 PROCEDURE — 6370000000 HC RX 637 (ALT 250 FOR IP): Performed by: SPECIALIST

## 2018-01-01 PROCEDURE — 86901 BLOOD TYPING SEROLOGIC RH(D): CPT

## 2018-01-01 PROCEDURE — 83605 ASSAY OF LACTIC ACID: CPT

## 2018-01-01 PROCEDURE — 2500000003 HC RX 250 WO HCPCS: Performed by: NURSE ANESTHETIST, CERTIFIED REGISTERED

## 2018-01-01 PROCEDURE — 36415 COLL VENOUS BLD VENIPUNCTURE: CPT

## 2018-01-01 PROCEDURE — 80307 DRUG TEST PRSMV CHEM ANLYZR: CPT

## 2018-01-01 PROCEDURE — 6370000000 HC RX 637 (ALT 250 FOR IP): Performed by: INTERNAL MEDICINE

## 2018-01-01 PROCEDURE — 93010 ELECTROCARDIOGRAM REPORT: CPT | Performed by: INTERNAL MEDICINE

## 2018-01-01 PROCEDURE — G8417 CALC BMI ABV UP PARAM F/U: HCPCS | Performed by: NURSE PRACTITIONER

## 2018-01-01 PROCEDURE — 82105 ALPHA-FETOPROTEIN SERUM: CPT

## 2018-01-01 PROCEDURE — 2500000003 HC RX 250 WO HCPCS: Performed by: RADIOLOGY

## 2018-01-01 PROCEDURE — 71046 X-RAY EXAM CHEST 2 VIEWS: CPT

## 2018-01-01 PROCEDURE — 2709999900 HC NON-CHARGEABLE SUPPLY: Performed by: SPECIALIST

## 2018-01-01 PROCEDURE — 2580000003 HC RX 258: Performed by: SPECIALIST

## 2018-01-01 PROCEDURE — P9016 RBC LEUKOCYTES REDUCED: HCPCS

## 2018-01-01 PROCEDURE — 7100000000 HC PACU RECOVERY - FIRST 15 MIN: Performed by: SPECIALIST

## 2018-01-01 PROCEDURE — G8427 DOCREV CUR MEDS BY ELIG CLIN: HCPCS | Performed by: NURSE PRACTITIONER

## 2018-01-01 PROCEDURE — 36569 INSJ PICC 5 YR+ W/O IMAGING: CPT | Performed by: RADIOLOGY

## 2018-01-01 PROCEDURE — 99291 CRITICAL CARE FIRST HOUR: CPT | Performed by: INTERNAL MEDICINE

## 2018-01-01 PROCEDURE — 86900 BLOOD TYPING SEROLOGIC ABO: CPT

## 2018-01-01 PROCEDURE — 2700000000 HC OXYGEN THERAPY PER DAY

## 2018-01-01 PROCEDURE — 76705 ECHO EXAM OF ABDOMEN: CPT

## 2018-01-01 PROCEDURE — 3017F COLORECTAL CA SCREEN DOC REV: CPT | Performed by: NURSE PRACTITIONER

## 2018-01-01 PROCEDURE — B548ZZA ULTRASONOGRAPHY OF SUPERIOR VENA CAVA, GUIDANCE: ICD-10-PCS | Performed by: RADIOLOGY

## 2018-01-01 PROCEDURE — 3609017100 HC EGD: Performed by: SPECIALIST

## 2018-01-01 PROCEDURE — 82378 CARCINOEMBRYONIC ANTIGEN: CPT

## 2018-01-01 PROCEDURE — 96375 TX/PRO/DX INJ NEW DRUG ADDON: CPT

## 2018-01-01 PROCEDURE — 2500000003 HC RX 250 WO HCPCS: Performed by: PHYSICIAN ASSISTANT

## 2018-01-01 PROCEDURE — 2580000003 HC RX 258: Performed by: ANESTHESIOLOGY

## 2018-01-01 PROCEDURE — P9046 ALBUMIN (HUMAN), 25%, 20 ML: HCPCS | Performed by: RADIOLOGY

## 2018-01-01 PROCEDURE — 81003 URINALYSIS AUTO W/O SCOPE: CPT

## 2018-01-01 PROCEDURE — 85730 THROMBOPLASTIN TIME PARTIAL: CPT

## 2018-01-01 PROCEDURE — 83615 LACTATE (LD) (LDH) ENZYME: CPT

## 2018-01-01 PROCEDURE — 87070 CULTURE OTHR SPECIMN AEROBIC: CPT

## 2018-01-01 PROCEDURE — G8427 DOCREV CUR MEDS BY ELIG CLIN: HCPCS | Performed by: PHYSICIAN ASSISTANT

## 2018-01-01 PROCEDURE — 84300 ASSAY OF URINE SODIUM: CPT

## 2018-01-01 PROCEDURE — 2709999900 US GUIDED PARACENTESIS

## 2018-01-01 PROCEDURE — 83540 ASSAY OF IRON: CPT

## 2018-01-01 PROCEDURE — 96376 TX/PRO/DX INJ SAME DRUG ADON: CPT

## 2018-01-01 PROCEDURE — 84157 ASSAY OF PROTEIN OTHER: CPT

## 2018-01-01 PROCEDURE — 82040 ASSAY OF SERUM ALBUMIN: CPT

## 2018-01-01 PROCEDURE — 99204 OFFICE O/P NEW MOD 45 MIN: CPT | Performed by: RADIOLOGY

## 2018-01-01 PROCEDURE — 99285 EMERGENCY DEPT VISIT HI MDM: CPT

## 2018-01-01 PROCEDURE — 6360000002 HC RX W HCPCS: Performed by: RADIOLOGY

## 2018-01-01 PROCEDURE — 2500000003 HC RX 250 WO HCPCS: Performed by: INTERNAL MEDICINE

## 2018-01-01 PROCEDURE — G8978 MOBILITY CURRENT STATUS: HCPCS

## 2018-01-01 PROCEDURE — 2500000003 HC RX 250 WO HCPCS: Performed by: NURSE PRACTITIONER

## 2018-01-01 PROCEDURE — G8417 CALC BMI ABV UP PARAM F/U: HCPCS | Performed by: PHYSICIAN ASSISTANT

## 2018-01-01 PROCEDURE — 1036F TOBACCO NON-USER: CPT | Performed by: RADIOLOGY

## 2018-01-01 PROCEDURE — 83930 ASSAY OF BLOOD OSMOLALITY: CPT

## 2018-01-01 PROCEDURE — 99211 OFF/OP EST MAY X REQ PHY/QHP: CPT | Performed by: PHARMACIST

## 2018-01-01 PROCEDURE — G0378 HOSPITAL OBSERVATION PER HR: HCPCS

## 2018-01-01 PROCEDURE — 88307 TISSUE EXAM BY PATHOLOGIST: CPT

## 2018-01-01 PROCEDURE — 97161 PT EVAL LOW COMPLEX 20 MIN: CPT

## 2018-01-01 PROCEDURE — 2580000003 HC RX 258

## 2018-01-01 PROCEDURE — 2709999900 IR PICC WO SQ PORT/PUMP > 5 YEARS

## 2018-01-01 PROCEDURE — 80051 ELECTROLYTE PANEL: CPT

## 2018-01-01 PROCEDURE — 84520 ASSAY OF UREA NITROGEN: CPT

## 2018-01-01 PROCEDURE — 86923 COMPATIBILITY TEST ELECTRIC: CPT

## 2018-01-01 PROCEDURE — 77012 CT SCAN FOR NEEDLE BIOPSY: CPT

## 2018-01-01 PROCEDURE — 49083 ABD PARACENTESIS W/IMAGING: CPT

## 2018-01-01 PROCEDURE — 88342 IMHCHEM/IMCYTCHM 1ST ANTB: CPT

## 2018-01-01 PROCEDURE — 47000 NEEDLE BIOPSY OF LIVER PERQ: CPT

## 2018-01-01 PROCEDURE — 82140 ASSAY OF AMMONIA: CPT

## 2018-01-01 PROCEDURE — 83735 ASSAY OF MAGNESIUM: CPT

## 2018-01-01 PROCEDURE — G8484 FLU IMMUNIZE NO ADMIN: HCPCS | Performed by: NURSE PRACTITIONER

## 2018-01-01 PROCEDURE — G8979 MOBILITY GOAL STATUS: HCPCS

## 2018-01-01 PROCEDURE — 94760 N-INVAS EAR/PLS OXIMETRY 1: CPT

## 2018-01-01 PROCEDURE — 2580000003 HC RX 258: Performed by: PHYSICIAN ASSISTANT

## 2018-01-01 PROCEDURE — 1123F ACP DISCUSS/DSCN MKR DOCD: CPT | Performed by: NURSE PRACTITIONER

## 2018-01-01 PROCEDURE — C9488 CONIVAPTAN HCL: HCPCS | Performed by: INTERNAL MEDICINE

## 2018-01-01 PROCEDURE — 86850 RBC ANTIBODY SCREEN: CPT

## 2018-01-01 PROCEDURE — 99223 1ST HOSP IP/OBS HIGH 75: CPT | Performed by: RADIOLOGY

## 2018-01-01 PROCEDURE — 87040 BLOOD CULTURE FOR BACTERIA: CPT

## 2018-01-01 PROCEDURE — 88341 IMHCHEM/IMCYTCHM EA ADD ANTB: CPT

## 2018-01-01 PROCEDURE — 82945 GLUCOSE OTHER FLUID: CPT

## 2018-01-01 PROCEDURE — 87205 SMEAR GRAM STAIN: CPT

## 2018-01-01 PROCEDURE — 99214 OFFICE O/P EST MOD 30 MIN: CPT | Performed by: RADIOLOGY

## 2018-01-01 PROCEDURE — 1036F TOBACCO NON-USER: CPT | Performed by: NURSE PRACTITIONER

## 2018-01-01 PROCEDURE — 49083 ABD PARACENTESIS W/IMAGING: CPT | Performed by: RADIOLOGY

## 2018-01-01 PROCEDURE — 2580000003 HC RX 258: Performed by: NURSE PRACTITIONER

## 2018-01-01 PROCEDURE — 6370000000 HC RX 637 (ALT 250 FOR IP): Performed by: RADIOLOGY

## 2018-01-01 PROCEDURE — 0DJ08ZZ INSPECTION OF UPPER INTESTINAL TRACT, VIA NATURAL OR ARTIFICIAL OPENING ENDOSCOPIC: ICD-10-PCS | Performed by: SPECIALIST

## 2018-01-01 PROCEDURE — P9040 RBC LEUKOREDUCED IRRADIATED: HCPCS

## 2018-01-01 PROCEDURE — 1111F DSCHRG MED/CURRENT MED MERGE: CPT | Performed by: RADIOLOGY

## 2018-01-01 PROCEDURE — 83036 HEMOGLOBIN GLYCOSYLATED A1C: CPT

## 2018-01-01 PROCEDURE — G8484 FLU IMMUNIZE NO ADMIN: HCPCS | Performed by: RADIOLOGY

## 2018-01-01 PROCEDURE — 47000 NEEDLE BIOPSY OF LIVER PERQ: CPT | Performed by: RADIOLOGY

## 2018-01-01 PROCEDURE — 87086 URINE CULTURE/COLONY COUNT: CPT

## 2018-01-01 PROCEDURE — 6370000000 HC RX 637 (ALT 250 FOR IP): Performed by: PHYSICIAN ASSISTANT

## 2018-01-01 PROCEDURE — 51702 INSERT TEMP BLADDER CATH: CPT

## 2018-01-01 PROCEDURE — 02HV33Z INSERTION OF INFUSION DEVICE INTO SUPERIOR VENA CAVA, PERCUTANEOUS APPROACH: ICD-10-PCS | Performed by: RADIOLOGY

## 2018-01-01 PROCEDURE — 83935 ASSAY OF URINE OSMOLALITY: CPT

## 2018-01-01 PROCEDURE — 6360000002 HC RX W HCPCS: Performed by: PHYSICIAN ASSISTANT

## 2018-01-01 PROCEDURE — 0W9G3ZZ DRAINAGE OF PERITONEAL CAVITY, PERCUTANEOUS APPROACH: ICD-10-PCS | Performed by: RADIOLOGY

## 2018-01-01 PROCEDURE — 82042 OTHER SOURCE ALBUMIN QUAN EA: CPT

## 2018-01-01 PROCEDURE — 2000000000 HC ICU R&B

## 2018-01-01 PROCEDURE — 94640 AIRWAY INHALATION TREATMENT: CPT

## 2018-01-01 PROCEDURE — 82570 ASSAY OF URINE CREATININE: CPT

## 2018-01-01 PROCEDURE — 1123F ACP DISCUSS/DSCN MKR DOCD: CPT | Performed by: PHYSICIAN ASSISTANT

## 2018-01-01 PROCEDURE — 6360000002 HC RX W HCPCS: Performed by: SPECIALIST

## 2018-01-01 PROCEDURE — 82150 ASSAY OF AMYLASE: CPT

## 2018-01-01 PROCEDURE — 82565 ASSAY OF CREATININE: CPT

## 2018-01-01 PROCEDURE — G8484 FLU IMMUNIZE NO ADMIN: HCPCS | Performed by: PHYSICIAN ASSISTANT

## 2018-01-01 PROCEDURE — G0480 DRUG TEST DEF 1-7 CLASSES: HCPCS

## 2018-01-01 PROCEDURE — 1036F TOBACCO NON-USER: CPT | Performed by: PHYSICIAN ASSISTANT

## 2018-01-01 PROCEDURE — 99213 OFFICE O/P EST LOW 20 MIN: CPT | Performed by: PHYSICIAN ASSISTANT

## 2018-01-01 PROCEDURE — 7100000001 HC PACU RECOVERY - ADDTL 15 MIN: Performed by: SPECIALIST

## 2018-01-01 PROCEDURE — 3700000000 HC ANESTHESIA ATTENDED CARE: Performed by: SPECIALIST

## 2018-01-01 PROCEDURE — 96365 THER/PROPH/DIAG IV INF INIT: CPT

## 2018-01-01 PROCEDURE — 3017F COLORECTAL CA SCREEN DOC REV: CPT | Performed by: PHYSICIAN ASSISTANT

## 2018-01-01 PROCEDURE — G8427 DOCREV CUR MEDS BY ELIG CLIN: HCPCS | Performed by: RADIOLOGY

## 2018-01-01 PROCEDURE — 77012 CT SCAN FOR NEEDLE BIOPSY: CPT | Performed by: RADIOLOGY

## 2018-01-01 PROCEDURE — 88313 SPECIAL STAINS GROUP 2: CPT

## 2018-01-01 PROCEDURE — 76937 US GUIDE VASCULAR ACCESS: CPT | Performed by: RADIOLOGY

## 2018-01-01 PROCEDURE — 6360000002 HC RX W HCPCS: Performed by: NURSE PRACTITIONER

## 2018-01-01 PROCEDURE — 6370000000 HC RX 637 (ALT 250 FOR IP): Performed by: NURSE PRACTITIONER

## 2018-01-01 PROCEDURE — 94664 DEMO&/EVAL PT USE INHALER: CPT

## 2018-01-01 PROCEDURE — 93005 ELECTROCARDIOGRAM TRACING: CPT

## 2018-01-01 PROCEDURE — 77001 FLUOROGUIDE FOR VEIN DEVICE: CPT | Performed by: RADIOLOGY

## 2018-01-01 PROCEDURE — 74176 CT ABD & PELVIS W/O CONTRAST: CPT

## 2018-01-01 PROCEDURE — 83550 IRON BINDING TEST: CPT

## 2018-01-01 PROCEDURE — 74247 FL UGI W AIR CONTRAST: CPT

## 2018-01-01 PROCEDURE — 4040F PNEUMOC VAC/ADMIN/RCVD: CPT | Performed by: NURSE PRACTITIONER

## 2018-01-01 PROCEDURE — 4040F PNEUMOC VAC/ADMIN/RCVD: CPT | Performed by: PHYSICIAN ASSISTANT

## 2018-01-01 PROCEDURE — G8980 MOBILITY D/C STATUS: HCPCS

## 2018-01-01 RX ORDER — WARFARIN SODIUM 5 MG/1
5 TABLET ORAL DAILY
Status: DISCONTINUED | OUTPATIENT
Start: 2018-01-01 | End: 2018-01-01

## 2018-01-01 RX ORDER — WARFARIN SODIUM 2 MG/1
2 TABLET ORAL
Status: COMPLETED | OUTPATIENT
Start: 2018-01-01 | End: 2018-01-01

## 2018-01-01 RX ORDER — LIDOCAINE HYDROCHLORIDE 10 MG/ML
1 INJECTION, SOLUTION EPIDURAL; INFILTRATION; INTRACAUDAL; PERINEURAL
Status: DISCONTINUED | OUTPATIENT
Start: 2018-01-01 | End: 2018-01-01 | Stop reason: HOSPADM

## 2018-01-01 RX ORDER — MEPERIDINE HYDROCHLORIDE 25 MG/ML
12.5 INJECTION INTRAMUSCULAR; INTRAVENOUS; SUBCUTANEOUS EVERY 5 MIN PRN
Status: DISCONTINUED | OUTPATIENT
Start: 2018-01-01 | End: 2018-01-01 | Stop reason: HOSPADM

## 2018-01-01 RX ORDER — ONDANSETRON 2 MG/ML
4 INJECTION INTRAMUSCULAR; INTRAVENOUS
Status: DISCONTINUED | OUTPATIENT
Start: 2018-01-01 | End: 2018-01-01 | Stop reason: HOSPADM

## 2018-01-01 RX ORDER — ALBUMIN (HUMAN) 12.5 G/50ML
50 SOLUTION INTRAVENOUS ONCE
Status: COMPLETED | OUTPATIENT
Start: 2018-01-01 | End: 2018-01-01

## 2018-01-01 RX ORDER — PHYTONADIONE 10 MG/ML
10 INJECTION, EMULSION INTRAMUSCULAR; INTRAVENOUS; SUBCUTANEOUS ONCE
Status: COMPLETED | OUTPATIENT
Start: 2018-01-01 | End: 2018-01-01

## 2018-01-01 RX ORDER — SODIUM CHLORIDE 9 MG/ML
INJECTION, SOLUTION INTRAVENOUS CONTINUOUS
Status: DISCONTINUED | OUTPATIENT
Start: 2018-01-01 | End: 2018-01-01

## 2018-01-01 RX ORDER — TOLVAPTAN 15 MG/1
30 TABLET ORAL ONCE
Status: COMPLETED | OUTPATIENT
Start: 2018-01-01 | End: 2018-01-01

## 2018-01-01 RX ORDER — PHYTONADIONE 5 MG/1
10 TABLET ORAL ONCE
Status: COMPLETED | OUTPATIENT
Start: 2018-01-01 | End: 2018-01-01

## 2018-01-01 RX ORDER — PHYTONADIONE 5 MG/1
5 TABLET ORAL ONCE
Status: COMPLETED | OUTPATIENT
Start: 2018-01-01 | End: 2018-01-01

## 2018-01-01 RX ORDER — 0.9 % SODIUM CHLORIDE 0.9 %
250 INTRAVENOUS SOLUTION INTRAVENOUS ONCE
Status: COMPLETED | OUTPATIENT
Start: 2018-01-01 | End: 2018-01-01

## 2018-01-01 RX ORDER — 0.9 % SODIUM CHLORIDE 0.9 %
500 INTRAVENOUS SOLUTION INTRAVENOUS
Status: DISCONTINUED | OUTPATIENT
Start: 2018-01-01 | End: 2018-01-01 | Stop reason: HOSPADM

## 2018-01-01 RX ORDER — DEXTROSE MONOHYDRATE 50 MG/ML
100 INJECTION, SOLUTION INTRAVENOUS PRN
Status: DISCONTINUED | OUTPATIENT
Start: 2018-01-01 | End: 2018-01-01

## 2018-01-01 RX ORDER — METOCLOPRAMIDE HYDROCHLORIDE 5 MG/ML
10 INJECTION INTRAMUSCULAR; INTRAVENOUS
Status: DISCONTINUED | OUTPATIENT
Start: 2018-01-01 | End: 2018-01-01 | Stop reason: HOSPADM

## 2018-01-01 RX ORDER — MAGNESIUM HYDROXIDE 1200 MG/15ML
LIQUID ORAL PRN
Status: DISCONTINUED | OUTPATIENT
Start: 2018-01-01 | End: 2018-01-01 | Stop reason: HOSPADM

## 2018-01-01 RX ORDER — GLYCOPYRROLATE 1 MG/5 ML
SYRINGE (ML) INTRAVENOUS PRN
Status: DISCONTINUED | OUTPATIENT
Start: 2018-01-01 | End: 2018-01-01 | Stop reason: SDUPTHER

## 2018-01-01 RX ORDER — HYDROCODONE BITARTRATE AND ACETAMINOPHEN 5; 325 MG/1; MG/1
2 TABLET ORAL PRN
Status: DISCONTINUED | OUTPATIENT
Start: 2018-01-01 | End: 2018-01-01 | Stop reason: HOSPADM

## 2018-01-01 RX ORDER — CHLORTHALIDONE 25 MG/1
25 TABLET ORAL DAILY
Status: ON HOLD | COMMUNITY
Start: 2018-01-01 | End: 2018-01-01 | Stop reason: HOSPADM

## 2018-01-01 RX ORDER — TOLVAPTAN 15 MG/1
15 TABLET ORAL DAILY
Status: DISCONTINUED | OUTPATIENT
Start: 2018-01-01 | End: 2018-01-01 | Stop reason: CLARIF

## 2018-01-01 RX ORDER — NITROGLYCERIN 0.4 MG/1
0.4 TABLET SUBLINGUAL EVERY 5 MIN PRN
Status: DISCONTINUED | OUTPATIENT
Start: 2018-01-01 | End: 2018-01-01 | Stop reason: HOSPADM

## 2018-01-01 RX ORDER — ASPIRIN 81 MG/1
81 TABLET, CHEWABLE ORAL DAILY
Status: DISCONTINUED | OUTPATIENT
Start: 2018-01-01 | End: 2018-01-01 | Stop reason: HOSPADM

## 2018-01-01 RX ORDER — LIDOCAINE HYDROCHLORIDE 20 MG/ML
INJECTION, SOLUTION INFILTRATION; PERINEURAL
Status: COMPLETED | OUTPATIENT
Start: 2018-01-01 | End: 2018-01-01

## 2018-01-01 RX ORDER — SODIUM CHLORIDE 0.9 % (FLUSH) 0.9 %
10 SYRINGE (ML) INJECTION EVERY 12 HOURS SCHEDULED
Status: DISCONTINUED | OUTPATIENT
Start: 2018-01-01 | End: 2018-01-01 | Stop reason: HOSPADM

## 2018-01-01 RX ORDER — FENTANYL CITRATE 50 UG/ML
50 INJECTION, SOLUTION INTRAMUSCULAR; INTRAVENOUS EVERY 10 MIN PRN
Status: DISCONTINUED | OUTPATIENT
Start: 2018-01-01 | End: 2018-01-01 | Stop reason: HOSPADM

## 2018-01-01 RX ORDER — SODIUM CHLORIDE 9 MG/ML
INJECTION, SOLUTION INTRAVENOUS
Status: COMPLETED
Start: 2018-01-01 | End: 2018-01-01

## 2018-01-01 RX ORDER — WARFARIN SODIUM 2.5 MG/1
2.5 TABLET ORAL DAILY
Status: DISCONTINUED | OUTPATIENT
Start: 2018-01-01 | End: 2018-01-01

## 2018-01-01 RX ORDER — PHYTONADIONE 5 MG/1
5 TABLET ORAL DAILY
Status: DISCONTINUED | OUTPATIENT
Start: 2018-01-01 | End: 2018-01-01

## 2018-01-01 RX ORDER — TOLVAPTAN 15 MG/1
15 TABLET ORAL ONCE
Status: COMPLETED | OUTPATIENT
Start: 2018-01-01 | End: 2018-01-01

## 2018-01-01 RX ORDER — TAMSULOSIN HYDROCHLORIDE 0.4 MG/1
0.4 CAPSULE ORAL 2 TIMES DAILY WITH MEALS
Status: DISCONTINUED | OUTPATIENT
Start: 2018-01-01 | End: 2018-01-01 | Stop reason: HOSPADM

## 2018-01-01 RX ORDER — ACETAMINOPHEN 325 MG/1
650 TABLET ORAL EVERY 4 HOURS PRN
Status: DISCONTINUED | OUTPATIENT
Start: 2018-01-01 | End: 2018-01-01 | Stop reason: HOSPADM

## 2018-01-01 RX ORDER — MIDAZOLAM HYDROCHLORIDE 1 MG/ML
0.5 INJECTION INTRAMUSCULAR; INTRAVENOUS
Status: DISCONTINUED | OUTPATIENT
Start: 2018-01-01 | End: 2018-01-01 | Stop reason: HOSPADM

## 2018-01-01 RX ORDER — CALCIUM GLUCONATE 94 MG/ML
1 INJECTION, SOLUTION INTRAVENOUS ONCE
Status: COMPLETED | OUTPATIENT
Start: 2018-01-01 | End: 2018-01-01

## 2018-01-01 RX ORDER — ALBUMIN (HUMAN) 12.5 G/50ML
100 SOLUTION INTRAVENOUS ONCE
Status: COMPLETED | OUTPATIENT
Start: 2018-01-01 | End: 2018-01-01

## 2018-01-01 RX ORDER — 3% SODIUM CHLORIDE 3 G/100ML
25 INJECTION, SOLUTION INTRAVENOUS CONTINUOUS
Status: DISPENSED | OUTPATIENT
Start: 2018-01-01 | End: 2018-01-01

## 2018-01-01 RX ORDER — WARFARIN SODIUM 5 MG/1
TABLET ORAL
Qty: 60 TABLET | Refills: 1 | Status: ON HOLD | OUTPATIENT
Start: 2018-01-01 | End: 2018-01-01 | Stop reason: HOSPADM

## 2018-01-01 RX ORDER — 0.9 % SODIUM CHLORIDE 0.9 %
10 VIAL (ML) INJECTION PRN
Status: DISCONTINUED | OUTPATIENT
Start: 2018-01-01 | End: 2018-01-01 | Stop reason: HOSPADM

## 2018-01-01 RX ORDER — BUMETANIDE 1 MG/1
2 TABLET ORAL 2 TIMES DAILY
Status: DISCONTINUED | OUTPATIENT
Start: 2018-01-01 | End: 2018-01-01 | Stop reason: HOSPADM

## 2018-01-01 RX ORDER — DEXTROSE MONOHYDRATE 25 G/50ML
12.5 INJECTION, SOLUTION INTRAVENOUS PRN
Status: DISCONTINUED | OUTPATIENT
Start: 2018-01-01 | End: 2018-01-01 | Stop reason: HOSPADM

## 2018-01-01 RX ORDER — TAMSULOSIN HYDROCHLORIDE 0.4 MG/1
0.4 CAPSULE ORAL DAILY
Status: DISCONTINUED | OUTPATIENT
Start: 2018-01-01 | End: 2018-01-01 | Stop reason: HOSPADM

## 2018-01-01 RX ORDER — 3% SODIUM CHLORIDE 3 G/100ML
25 INJECTION, SOLUTION INTRAVENOUS CONTINUOUS
Status: DISCONTINUED | OUTPATIENT
Start: 2018-01-01 | End: 2018-01-01 | Stop reason: CLARIF

## 2018-01-01 RX ORDER — HYDROCODONE BITARTRATE AND ACETAMINOPHEN 5; 325 MG/1; MG/1
1 TABLET ORAL PRN
Status: DISCONTINUED | OUTPATIENT
Start: 2018-01-01 | End: 2018-01-01 | Stop reason: HOSPADM

## 2018-01-01 RX ORDER — 0.9 % SODIUM CHLORIDE 0.9 %
250 INTRAVENOUS SOLUTION INTRAVENOUS
Status: DISCONTINUED | OUTPATIENT
Start: 2018-01-01 | End: 2018-01-01 | Stop reason: HOSPADM

## 2018-01-01 RX ORDER — SODIUM CHLORIDE 0.9 % (FLUSH) 0.9 %
10 SYRINGE (ML) INJECTION PRN
Status: DISCONTINUED | OUTPATIENT
Start: 2018-01-01 | End: 2018-01-01 | Stop reason: HOSPADM

## 2018-01-01 RX ORDER — DIPHENHYDRAMINE HYDROCHLORIDE 50 MG/ML
12.5 INJECTION INTRAMUSCULAR; INTRAVENOUS
Status: DISCONTINUED | OUTPATIENT
Start: 2018-01-01 | End: 2018-01-01 | Stop reason: HOSPADM

## 2018-01-01 RX ORDER — FENTANYL CITRATE 50 UG/ML
50 INJECTION, SOLUTION INTRAMUSCULAR; INTRAVENOUS
Status: DISCONTINUED | OUTPATIENT
Start: 2018-01-01 | End: 2018-01-01 | Stop reason: HOSPADM

## 2018-01-01 RX ORDER — METOLAZONE 5 MG/1
5 TABLET ORAL ONCE
Status: COMPLETED | OUTPATIENT
Start: 2018-01-01 | End: 2018-01-01

## 2018-01-01 RX ORDER — SPIRONOLACTONE 25 MG/1
25 TABLET ORAL DAILY
Status: DISCONTINUED | OUTPATIENT
Start: 2018-01-01 | End: 2018-01-01 | Stop reason: HOSPADM

## 2018-01-01 RX ORDER — BUMETANIDE 1 MG/1
2 TABLET ORAL DAILY
Status: DISCONTINUED | OUTPATIENT
Start: 2018-01-01 | End: 2018-01-01

## 2018-01-01 RX ORDER — ACETAMINOPHEN 325 MG/1
650 TABLET ORAL EVERY 4 HOURS PRN
Status: DISCONTINUED | OUTPATIENT
Start: 2018-01-01 | End: 2018-01-01 | Stop reason: SDUPTHER

## 2018-01-01 RX ORDER — PROPOFOL 10 MG/ML
INJECTION, EMULSION INTRAVENOUS PRN
Status: DISCONTINUED | OUTPATIENT
Start: 2018-01-01 | End: 2018-01-01 | Stop reason: SDUPTHER

## 2018-01-01 RX ORDER — NEBIVOLOL 5 MG/1
5 TABLET ORAL DAILY
Status: DISCONTINUED | OUTPATIENT
Start: 2018-01-01 | End: 2018-01-01 | Stop reason: HOSPADM

## 2018-01-01 RX ORDER — LIDOCAINE HYDROCHLORIDE 20 MG/ML
5 INJECTION, SOLUTION INFILTRATION; PERINEURAL
Status: DISCONTINUED | OUTPATIENT
Start: 2018-01-01 | End: 2018-01-01 | Stop reason: HOSPADM

## 2018-01-01 RX ORDER — LACTULOSE 10 G/15ML
20 SOLUTION ORAL ONCE
Status: COMPLETED | OUTPATIENT
Start: 2018-01-01 | End: 2018-01-01

## 2018-01-01 RX ORDER — FUROSEMIDE 10 MG/ML
20 INJECTION INTRAMUSCULAR; INTRAVENOUS ONCE
Status: COMPLETED | OUTPATIENT
Start: 2018-01-01 | End: 2018-01-01

## 2018-01-01 RX ORDER — SIMVASTATIN 40 MG
40 TABLET ORAL NIGHTLY
Status: ON HOLD | COMMUNITY
End: 2018-01-01 | Stop reason: HOSPADM

## 2018-01-01 RX ORDER — ZOLPIDEM TARTRATE 5 MG/1
5 TABLET ORAL NIGHTLY PRN
Status: DISCONTINUED | OUTPATIENT
Start: 2018-01-01 | End: 2018-01-01 | Stop reason: HOSPADM

## 2018-01-01 RX ORDER — MIDODRINE HYDROCHLORIDE 5 MG/1
5 TABLET ORAL
Status: DISCONTINUED | OUTPATIENT
Start: 2018-01-01 | End: 2018-01-01 | Stop reason: HOSPADM

## 2018-01-01 RX ORDER — 3% SODIUM CHLORIDE 3 G/100ML
50 INJECTION, SOLUTION INTRAVENOUS CONTINUOUS
Status: DISPENSED | OUTPATIENT
Start: 2018-01-01 | End: 2018-01-01

## 2018-01-01 RX ORDER — IBUPROFEN 200 MG
TABLET ORAL ONCE
Status: DISCONTINUED | OUTPATIENT
Start: 2018-01-01 | End: 2018-01-01 | Stop reason: HOSPADM

## 2018-01-01 RX ORDER — LIDOCAINE HYDROCHLORIDE 20 MG/ML
5 INJECTION, SOLUTION INFILTRATION; PERINEURAL ONCE
Status: COMPLETED | OUTPATIENT
Start: 2018-01-01 | End: 2018-01-01

## 2018-01-01 RX ORDER — 0.9 % SODIUM CHLORIDE 0.9 %
500 INTRAVENOUS SOLUTION INTRAVENOUS ONCE
Status: COMPLETED | OUTPATIENT
Start: 2018-01-01 | End: 2018-01-01

## 2018-01-01 RX ORDER — TOLVAPTAN 15 MG/1
15 TABLET ORAL DAILY
Status: COMPLETED | OUTPATIENT
Start: 2018-01-01 | End: 2018-01-01

## 2018-01-01 RX ORDER — LIDOCAINE HYDROCHLORIDE 20 MG/ML
INJECTION, SOLUTION INFILTRATION; PERINEURAL PRN
Status: DISCONTINUED | OUTPATIENT
Start: 2018-01-01 | End: 2018-01-01 | Stop reason: SDUPTHER

## 2018-01-01 RX ORDER — PANTOPRAZOLE SODIUM 40 MG/1
40 TABLET, DELAYED RELEASE ORAL
Status: DISCONTINUED | OUTPATIENT
Start: 2018-01-01 | End: 2018-01-01

## 2018-01-01 RX ORDER — LACTULOSE 10 G/15ML
20 SOLUTION ORAL 3 TIMES DAILY
Status: DISCONTINUED | OUTPATIENT
Start: 2018-01-01 | End: 2018-01-01

## 2018-01-01 RX ORDER — GLIPIZIDE 5 MG/1
5 TABLET ORAL DAILY
Status: ON HOLD | COMMUNITY
End: 2018-01-01 | Stop reason: HOSPADM

## 2018-01-01 RX ORDER — 0.9 % SODIUM CHLORIDE 0.9 %
250 INTRAVENOUS SOLUTION INTRAVENOUS ONCE
Status: DISCONTINUED | OUTPATIENT
Start: 2018-01-01 | End: 2018-01-01

## 2018-01-01 RX ORDER — DEXTROSE MONOHYDRATE 25 G/50ML
25 INJECTION, SOLUTION INTRAVENOUS ONCE
Status: COMPLETED | OUTPATIENT
Start: 2018-01-01 | End: 2018-01-01

## 2018-01-01 RX ORDER — ALBUMIN (HUMAN) 12.5 G/50ML
25 SOLUTION INTRAVENOUS PRN
Status: DISCONTINUED | OUTPATIENT
Start: 2018-01-01 | End: 2018-01-01 | Stop reason: HOSPADM

## 2018-01-01 RX ORDER — SPIRONOLACTONE 25 MG/1
25 TABLET ORAL DAILY
Status: DISCONTINUED | OUTPATIENT
Start: 2018-01-01 | End: 2018-01-01

## 2018-01-01 RX ORDER — BUMETANIDE 1 MG/1
1 TABLET ORAL DAILY
Status: DISCONTINUED | OUTPATIENT
Start: 2018-01-01 | End: 2018-01-01 | Stop reason: HOSPADM

## 2018-01-01 RX ORDER — ALBUTEROL SULFATE 90 UG/1
2 AEROSOL, METERED RESPIRATORY (INHALATION) EVERY 6 HOURS PRN
Status: DISCONTINUED | OUTPATIENT
Start: 2018-01-01 | End: 2018-01-01 | Stop reason: HOSPADM

## 2018-01-01 RX ORDER — SODIUM CHLORIDE 9 MG/ML
250 INJECTION, SOLUTION INTRAVENOUS ONCE
Status: DISCONTINUED | OUTPATIENT
Start: 2018-01-01 | End: 2018-01-01

## 2018-01-01 RX ORDER — TOBRAMYCIN AND DEXAMETHASONE 3; 1 MG/ML; MG/ML
1 SUSPENSION/ DROPS OPHTHALMIC 4 TIMES DAILY
Status: DISCONTINUED | OUTPATIENT
Start: 2018-01-01 | End: 2018-01-01 | Stop reason: HOSPADM

## 2018-01-01 RX ORDER — WARFARIN SODIUM 1 MG/1
TABLET ORAL
Qty: 60 TABLET | Refills: 1 | Status: SHIPPED | OUTPATIENT
Start: 2018-01-01

## 2018-01-01 RX ORDER — SODIUM CHLORIDE, SODIUM LACTATE, POTASSIUM CHLORIDE, CALCIUM CHLORIDE 600; 310; 30; 20 MG/100ML; MG/100ML; MG/100ML; MG/100ML
INJECTION, SOLUTION INTRAVENOUS CONTINUOUS
Status: DISCONTINUED | OUTPATIENT
Start: 2018-01-01 | End: 2018-01-01

## 2018-01-01 RX ORDER — NICOTINE POLACRILEX 4 MG
15 LOZENGE BUCCAL PRN
Status: DISCONTINUED | OUTPATIENT
Start: 2018-01-01 | End: 2018-01-01 | Stop reason: HOSPADM

## 2018-01-01 RX ORDER — BUMETANIDE 1 MG/1
1 TABLET ORAL 2 TIMES DAILY
Status: DISCONTINUED | OUTPATIENT
Start: 2018-01-01 | End: 2018-01-01

## 2018-01-01 RX ORDER — LANOLIN ALCOHOL/MO/W.PET/CERES
1000 CREAM (GRAM) TOPICAL DAILY
COMMUNITY

## 2018-01-01 RX ORDER — TRAMADOL HYDROCHLORIDE 50 MG/1
50 TABLET ORAL EVERY 6 HOURS PRN
Status: DISCONTINUED | OUTPATIENT
Start: 2018-01-01 | End: 2018-01-01 | Stop reason: HOSPADM

## 2018-01-01 RX ORDER — TAMSULOSIN HYDROCHLORIDE 0.4 MG/1
0.4 CAPSULE ORAL DAILY
Status: DISCONTINUED | OUTPATIENT
Start: 2018-01-01 | End: 2018-01-01 | Stop reason: SDUPTHER

## 2018-01-01 RX ORDER — SODIUM POLYSTYRENE SULFONATE 15 G/60ML
15 SUSPENSION ORAL; RECTAL ONCE
Status: COMPLETED | OUTPATIENT
Start: 2018-01-01 | End: 2018-01-01

## 2018-01-01 RX ORDER — 0.9 % SODIUM CHLORIDE 0.9 %
10 VIAL (ML) INJECTION EVERY 12 HOURS SCHEDULED
Status: DISCONTINUED | OUTPATIENT
Start: 2018-01-01 | End: 2018-01-01 | Stop reason: HOSPADM

## 2018-01-01 RX ADMIN — Medication 10 ML: at 20:49

## 2018-01-01 RX ADMIN — SODIUM CHLORIDE 50 ML/HR: 3 INJECTION, SOLUTION INTRAVENOUS at 11:07

## 2018-01-01 RX ADMIN — GELATIN ABSORBABLE SPONGE 12-7 MM 1 EACH: 12-7 MISC at 12:15

## 2018-01-01 RX ADMIN — PANTOPRAZOLE SODIUM 8 MG/HR: 40 INJECTION, POWDER, FOR SOLUTION INTRAVENOUS at 06:26

## 2018-01-01 RX ADMIN — TAMSULOSIN HYDROCHLORIDE 0.4 MG: 0.4 CAPSULE ORAL at 08:05

## 2018-01-01 RX ADMIN — TOBRAMYCIN AND DEXAMETHASONE 1 DROP: 3; 1 SUSPENSION/ DROPS OPHTHALMIC at 08:49

## 2018-01-01 RX ADMIN — Medication 10 ML: at 20:31

## 2018-01-01 RX ADMIN — PHYTONADIONE 5 MG: 5 TABLET ORAL at 16:35

## 2018-01-01 RX ADMIN — SODIUM CHLORIDE 250 ML: 9 INJECTION, SOLUTION INTRAVENOUS at 13:55

## 2018-01-01 RX ADMIN — BARIUM SULFATE 176 G: 960 POWDER, FOR SUSPENSION ORAL at 10:05

## 2018-01-01 RX ADMIN — MIDODRINE HYDROCHLORIDE 5 MG: 5 TABLET ORAL at 17:17

## 2018-01-01 RX ADMIN — SPIRONOLACTONE 25 MG: 25 TABLET, FILM COATED ORAL at 08:17

## 2018-01-01 RX ADMIN — LIDOCAINE HYDROCHLORIDE 40 MG: 20 INJECTION, SOLUTION INFILTRATION; PERINEURAL at 15:27

## 2018-01-01 RX ADMIN — SODIUM CHLORIDE 25 ML/HR: 3 INJECTION, SOLUTION INTRAVENOUS at 10:55

## 2018-01-01 RX ADMIN — MIDODRINE HYDROCHLORIDE 5 MG: 5 TABLET ORAL at 12:42

## 2018-01-01 RX ADMIN — Medication 10 ML: at 20:32

## 2018-01-01 RX ADMIN — Medication 10 ML: at 14:37

## 2018-01-01 RX ADMIN — TAMSULOSIN HYDROCHLORIDE 0.4 MG: 0.4 CAPSULE ORAL at 08:42

## 2018-01-01 RX ADMIN — MIDODRINE HYDROCHLORIDE 5 MG: 5 TABLET ORAL at 18:02

## 2018-01-01 RX ADMIN — LIDOCAINE HYDROCHLORIDE 5 ML: 20 INJECTION, SOLUTION INFILTRATION; PERINEURAL at 12:08

## 2018-01-01 RX ADMIN — FENTANYL CITRATE 50 MCG: 50 INJECTION, SOLUTION INTRAMUSCULAR; INTRAVENOUS at 12:06

## 2018-01-01 RX ADMIN — SODIUM CHLORIDE 500 ML: 9 INJECTION, SOLUTION INTRAVENOUS at 01:40

## 2018-01-01 RX ADMIN — SODIUM CHLORIDE: 9 INJECTION, SOLUTION INTRAVENOUS at 07:00

## 2018-01-01 RX ADMIN — PHYTONADIONE 10 MG: 10 INJECTION, EMULSION INTRAMUSCULAR; INTRAVENOUS; SUBCUTANEOUS at 11:46

## 2018-01-01 RX ADMIN — SODIUM BICARBONATE 50 MEQ: 84 INJECTION, SOLUTION INTRAVENOUS at 04:21

## 2018-01-01 RX ADMIN — TOBRAMYCIN AND DEXAMETHASONE 1 DROP: 3; 1 SUSPENSION/ DROPS OPHTHALMIC at 16:49

## 2018-01-01 RX ADMIN — SODIUM CHLORIDE 25 ML/HR: 3 INJECTION, SOLUTION INTRAVENOUS at 15:05

## 2018-01-01 RX ADMIN — ENOXAPARIN SODIUM 30 MG: 30 INJECTION SUBCUTANEOUS at 08:53

## 2018-01-01 RX ADMIN — TAMSULOSIN HYDROCHLORIDE 0.4 MG: 0.4 CAPSULE ORAL at 09:12

## 2018-01-01 RX ADMIN — PIPERACILLIN SODIUM,TAZOBACTAM SODIUM 3.38 G: 3; .375 INJECTION, POWDER, FOR SOLUTION INTRAVENOUS at 14:45

## 2018-01-01 RX ADMIN — BUMETANIDE 1 MG: 1 TABLET ORAL at 22:07

## 2018-01-01 RX ADMIN — TAMSULOSIN HYDROCHLORIDE 0.4 MG: 0.4 CAPSULE ORAL at 09:33

## 2018-01-01 RX ADMIN — SODIUM CHLORIDE: 9 INJECTION, SOLUTION INTRAVENOUS at 23:27

## 2018-01-01 RX ADMIN — Medication 10 ML: at 10:02

## 2018-01-01 RX ADMIN — CONIVAPTAN HYDROCHLORIDE 20 MG: 20 INJECTION, SOLUTION INTRAVENOUS at 18:00

## 2018-01-01 RX ADMIN — PROPOFOL 50 MG: 10 INJECTION, EMULSION INTRAVENOUS at 15:27

## 2018-01-01 RX ADMIN — Medication 10 ML: at 08:46

## 2018-01-01 RX ADMIN — MIDAZOLAM HYDROCHLORIDE 0.5 MG: 1 INJECTION, SOLUTION INTRAMUSCULAR; INTRAVENOUS at 12:07

## 2018-01-01 RX ADMIN — TAMSULOSIN HYDROCHLORIDE 0.4 MG: 0.4 CAPSULE ORAL at 16:35

## 2018-01-01 RX ADMIN — ENOXAPARIN SODIUM 30 MG: 30 INJECTION SUBCUTANEOUS at 10:54

## 2018-01-01 RX ADMIN — PANTOPRAZOLE SODIUM 8 MG/HR: 40 INJECTION, POWDER, FOR SOLUTION INTRAVENOUS at 01:57

## 2018-01-01 RX ADMIN — TOLVAPTAN 15 MG: 15 TABLET ORAL at 10:32

## 2018-01-01 RX ADMIN — Medication 10 ML: at 20:30

## 2018-01-01 RX ADMIN — Medication 10 ML: at 22:26

## 2018-01-01 RX ADMIN — Medication 10 ML: at 09:33

## 2018-01-01 RX ADMIN — PANTOPRAZOLE SODIUM 8 MG/HR: 40 INJECTION, POWDER, FOR SOLUTION INTRAVENOUS at 17:16

## 2018-01-01 RX ADMIN — Medication 10 ML: at 23:07

## 2018-01-01 RX ADMIN — TAMSULOSIN HYDROCHLORIDE 0.4 MG: 0.4 CAPSULE ORAL at 09:05

## 2018-01-01 RX ADMIN — FUROSEMIDE 20 MG: 10 INJECTION, SOLUTION INTRAVENOUS at 18:28

## 2018-01-01 RX ADMIN — MIDODRINE HYDROCHLORIDE 5 MG: 5 TABLET ORAL at 12:57

## 2018-01-01 RX ADMIN — LINAGLIPTIN 5 MG: 5 TABLET, FILM COATED ORAL at 09:56

## 2018-01-01 RX ADMIN — ASPIRIN 81 MG 81 MG: 81 TABLET ORAL at 08:57

## 2018-01-01 RX ADMIN — Medication 10 ML: at 21:57

## 2018-01-01 RX ADMIN — SODIUM CHLORIDE 250 ML: 9 INJECTION, SOLUTION INTRAVENOUS at 13:23

## 2018-01-01 RX ADMIN — PHYTONADIONE 10 MG: 10 INJECTION, EMULSION INTRAMUSCULAR; INTRAVENOUS; SUBCUTANEOUS at 11:38

## 2018-01-01 RX ADMIN — ZOLPIDEM TARTRATE 5 MG: 5 TABLET ORAL at 22:34

## 2018-01-01 RX ADMIN — BUMETANIDE 2 MG: 1 TABLET ORAL at 08:05

## 2018-01-01 RX ADMIN — PHYTONADIONE 5 MG: 5 TABLET ORAL at 10:42

## 2018-01-01 RX ADMIN — ASPIRIN 81 MG 81 MG: 81 TABLET ORAL at 09:56

## 2018-01-01 RX ADMIN — SPIRONOLACTONE 25 MG: 25 TABLET ORAL at 08:24

## 2018-01-01 RX ADMIN — PHYTONADIONE 10 MG: 5 TABLET ORAL at 10:55

## 2018-01-01 RX ADMIN — LINAGLIPTIN 5 MG: 5 TABLET, FILM COATED ORAL at 08:36

## 2018-01-01 RX ADMIN — WARFARIN SODIUM 2 MG: 2 TABLET ORAL at 18:01

## 2018-01-01 RX ADMIN — TAMSULOSIN HYDROCHLORIDE 0.4 MG: 0.4 CAPSULE ORAL at 08:36

## 2018-01-01 RX ADMIN — Medication 10 ML: at 10:33

## 2018-01-01 RX ADMIN — IRON SUCROSE 100 MG: 20 INJECTION, SOLUTION INTRAVENOUS at 10:33

## 2018-01-01 RX ADMIN — SPIRONOLACTONE 25 MG: 25 TABLET ORAL at 18:25

## 2018-01-01 RX ADMIN — PANTOPRAZOLE SODIUM 40 MG: 40 TABLET, DELAYED RELEASE ORAL at 06:56

## 2018-01-01 RX ADMIN — TRAMADOL HYDROCHLORIDE 50 MG: 50 TABLET, FILM COATED ORAL at 22:28

## 2018-01-01 RX ADMIN — ALBUMIN (HUMAN) 50 G: 0.25 INJECTION, SOLUTION INTRAVENOUS at 12:22

## 2018-01-01 RX ADMIN — PANTOPRAZOLE SODIUM 40 MG: 40 TABLET, DELAYED RELEASE ORAL at 06:39

## 2018-01-01 RX ADMIN — PHYTONADIONE 5 MG: 5 TABLET ORAL at 10:44

## 2018-01-01 RX ADMIN — METOLAZONE 5 MG: 5 TABLET ORAL at 10:49

## 2018-01-01 RX ADMIN — PANTOPRAZOLE SODIUM 40 MG: 40 TABLET, DELAYED RELEASE ORAL at 06:23

## 2018-01-01 RX ADMIN — WARFARIN SODIUM 2 MG: 2 TABLET ORAL at 18:17

## 2018-01-01 RX ADMIN — PANTOPRAZOLE SODIUM 8 MG/HR: 40 INJECTION, POWDER, FOR SOLUTION INTRAVENOUS at 19:37

## 2018-01-01 RX ADMIN — SPIRONOLACTONE 25 MG: 25 TABLET ORAL at 09:17

## 2018-01-01 RX ADMIN — TAMSULOSIN HYDROCHLORIDE 0.4 MG: 0.4 CAPSULE ORAL at 16:52

## 2018-01-01 RX ADMIN — TOLVAPTAN 15 MG: 15 TABLET ORAL at 10:54

## 2018-01-01 RX ADMIN — CONIVAPTAN HYDROCHLORIDE 20 MG: 20 INJECTION, SOLUTION INTRAVENOUS at 08:26

## 2018-01-01 RX ADMIN — PHYTONADIONE 5 MG: 5 TABLET ORAL at 12:11

## 2018-01-01 RX ADMIN — LIDOCAINE HYDROCHLORIDE 8 ML: 20 INJECTION, SOLUTION INFILTRATION; PERINEURAL at 12:28

## 2018-01-01 RX ADMIN — FUROSEMIDE 20 MG/HR: 10 INJECTION, SOLUTION INTRAVENOUS at 22:28

## 2018-01-01 RX ADMIN — TOLVAPTAN 15 MG: 15 TABLET ORAL at 11:07

## 2018-01-01 RX ADMIN — ASPIRIN 81 MG 81 MG: 81 TABLET ORAL at 08:17

## 2018-01-01 RX ADMIN — ALBUTEROL SULFATE 10 MG: 2.5 SOLUTION RESPIRATORY (INHALATION) at 03:42

## 2018-01-01 RX ADMIN — MIDODRINE HYDROCHLORIDE 5 MG: 5 TABLET ORAL at 18:14

## 2018-01-01 RX ADMIN — ALBUMIN (HUMAN) 50 G: 0.25 INJECTION, SOLUTION INTRAVENOUS at 17:04

## 2018-01-01 RX ADMIN — SODIUM CHLORIDE 25 ML/HR: 3 INJECTION, SOLUTION INTRAVENOUS at 10:53

## 2018-01-01 RX ADMIN — TAMSULOSIN HYDROCHLORIDE 0.4 MG: 0.4 CAPSULE ORAL at 09:17

## 2018-01-01 RX ADMIN — CALCIUM GLUCONATE 1 G: 98 INJECTION, SOLUTION INTRAVENOUS at 04:05

## 2018-01-01 RX ADMIN — BUMETANIDE 2 MG: 1 TABLET ORAL at 18:12

## 2018-01-01 RX ADMIN — SODIUM CHLORIDE 50 ML/HR: 3 INJECTION, SOLUTION INTRAVENOUS at 10:32

## 2018-01-01 RX ADMIN — TOBRAMYCIN AND DEXAMETHASONE 1 DROP: 3; 1 SUSPENSION/ DROPS OPHTHALMIC at 14:34

## 2018-01-01 RX ADMIN — SODIUM CHLORIDE 250 ML: 9 INJECTION, SOLUTION INTRAVENOUS at 13:10

## 2018-01-01 RX ADMIN — BUMETANIDE 2 MG: 1 TABLET ORAL at 21:52

## 2018-01-01 RX ADMIN — CONIVAPTAN HYDROCHLORIDE 20 MG: 20 INJECTION, SOLUTION INTRAVENOUS at 01:18

## 2018-01-01 RX ADMIN — PANTOPRAZOLE SODIUM 8 MG/HR: 40 INJECTION, POWDER, FOR SOLUTION INTRAVENOUS at 05:50

## 2018-01-01 RX ADMIN — SODIUM CHLORIDE 50 ML/HR: 3 INJECTION, SOLUTION INTRAVENOUS at 10:15

## 2018-01-01 RX ADMIN — SODIUM CHLORIDE 50 ML/HR: 3 INJECTION, SOLUTION INTRAVENOUS at 23:06

## 2018-01-01 RX ADMIN — Medication 250 ML: at 16:38

## 2018-01-01 RX ADMIN — SODIUM CHLORIDE 25 ML/HR: 3 INJECTION, SOLUTION INTRAVENOUS at 01:40

## 2018-01-01 RX ADMIN — CONIVAPTAN HYDROCHLORIDE 20 MG: 20 INJECTION, SOLUTION INTRAVENOUS at 10:55

## 2018-01-01 RX ADMIN — FUROSEMIDE 10 MG/HR: 10 INJECTION, SOLUTION INTRAVENOUS at 09:38

## 2018-01-01 RX ADMIN — PIPERACILLIN SODIUM,TAZOBACTAM SODIUM 3.38 G: 3; .375 INJECTION, POWDER, FOR SOLUTION INTRAVENOUS at 05:24

## 2018-01-01 RX ADMIN — TOLVAPTAN 30 MG: 15 TABLET ORAL at 14:56

## 2018-01-01 RX ADMIN — SODIUM CHLORIDE 25 ML/HR: 3 INJECTION, SOLUTION INTRAVENOUS at 14:50

## 2018-01-01 RX ADMIN — ASPIRIN 81 MG 81 MG: 81 TABLET ORAL at 08:35

## 2018-01-01 RX ADMIN — Medication 250 ML: at 16:19

## 2018-01-01 RX ADMIN — BUMETANIDE 1 MG: 1 TABLET ORAL at 08:42

## 2018-01-01 RX ADMIN — SPIRONOLACTONE 25 MG: 25 TABLET ORAL at 09:33

## 2018-01-01 RX ADMIN — FUROSEMIDE 20 MG: 10 INJECTION, SOLUTION INTRAVENOUS at 15:06

## 2018-01-01 RX ADMIN — VASOPRESSIN 0.03 UNITS/MIN: 20 INJECTION INTRAVENOUS at 08:44

## 2018-01-01 RX ADMIN — ALBUMIN (HUMAN) 100 G: 0.25 INJECTION, SOLUTION INTRAVENOUS at 10:02

## 2018-01-01 RX ADMIN — PHYTONADIONE 10 MG: 10 INJECTION, EMULSION INTRAMUSCULAR; INTRAVENOUS; SUBCUTANEOUS at 09:24

## 2018-01-01 RX ADMIN — DEXTROSE MONOHYDRATE 25 G: 25 INJECTION, SOLUTION INTRAVENOUS at 04:27

## 2018-01-01 RX ADMIN — PHYTONADIONE 10 MG: 10 INJECTION, EMULSION INTRAMUSCULAR; INTRAVENOUS; SUBCUTANEOUS at 10:45

## 2018-01-01 RX ADMIN — TAMSULOSIN HYDROCHLORIDE 0.4 MG: 0.4 CAPSULE ORAL at 08:04

## 2018-01-01 RX ADMIN — FUROSEMIDE 10 MG/HR: 10 INJECTION, SOLUTION INTRAVENOUS at 14:56

## 2018-01-01 RX ADMIN — CONIVAPTAN HYDROCHLORIDE 20 MG: 20 INJECTION, SOLUTION INTRAVENOUS at 11:59

## 2018-01-01 RX ADMIN — SODIUM CHLORIDE 250 ML: 9 INJECTION, SOLUTION INTRAVENOUS at 16:19

## 2018-01-01 RX ADMIN — SODIUM POLYSTYRENE SULFONATE 15 G: 15 SUSPENSION ORAL; RECTAL at 04:27

## 2018-01-01 RX ADMIN — SODIUM CHLORIDE 250 ML: 9 INJECTION, SOLUTION INTRAVENOUS at 11:45

## 2018-01-01 RX ADMIN — LIDOCAINE HYDROCHLORIDE 5 ML: 20 INJECTION, SOLUTION INFILTRATION; PERINEURAL at 15:28

## 2018-01-01 RX ADMIN — PHYTONADIONE 10 MG: 5 TABLET ORAL at 11:36

## 2018-01-01 RX ADMIN — TAMSULOSIN HYDROCHLORIDE 0.4 MG: 0.4 CAPSULE ORAL at 08:57

## 2018-01-01 RX ADMIN — CONIVAPTAN HYDROCHLORIDE 20 MG: 20 INJECTION, SOLUTION INTRAVENOUS at 14:36

## 2018-01-01 RX ADMIN — MIDODRINE HYDROCHLORIDE 5 MG: 5 TABLET ORAL at 08:04

## 2018-01-01 RX ADMIN — PHYTONADIONE 10 MG: 5 TABLET ORAL at 10:01

## 2018-01-01 RX ADMIN — Medication 10 ML: at 08:43

## 2018-01-01 RX ADMIN — LINAGLIPTIN 5 MG: 5 TABLET, FILM COATED ORAL at 08:18

## 2018-01-01 RX ADMIN — WARFARIN SODIUM 5 MG: 5 TABLET ORAL at 16:52

## 2018-01-01 RX ADMIN — SPIRONOLACTONE 25 MG: 25 TABLET, FILM COATED ORAL at 08:36

## 2018-01-01 RX ADMIN — TAMSULOSIN HYDROCHLORIDE 0.4 MG: 0.4 CAPSULE ORAL at 08:18

## 2018-01-01 RX ADMIN — ALBUMIN (HUMAN) 50 G: 0.25 INJECTION, SOLUTION INTRAVENOUS at 14:49

## 2018-01-01 RX ADMIN — TRAMADOL HYDROCHLORIDE 50 MG: 50 TABLET, FILM COATED ORAL at 13:43

## 2018-01-01 RX ADMIN — ALBUMIN (HUMAN) 25 G: 0.25 INJECTION, SOLUTION INTRAVENOUS at 13:21

## 2018-01-01 RX ADMIN — TAMSULOSIN HYDROCHLORIDE 0.4 MG: 0.4 CAPSULE ORAL at 09:09

## 2018-01-01 RX ADMIN — PANTOPRAZOLE SODIUM 8 MG/HR: 40 INJECTION, POWDER, FOR SOLUTION INTRAVENOUS at 11:27

## 2018-01-01 RX ADMIN — VASOPRESSIN 0.03 UNITS/MIN: 20 INJECTION INTRAVENOUS at 23:07

## 2018-01-01 RX ADMIN — PIPERACILLIN SODIUM,TAZOBACTAM SODIUM 3.38 G: 3; .375 INJECTION, POWDER, FOR SOLUTION INTRAVENOUS at 20:31

## 2018-01-01 RX ADMIN — PHYTONADIONE 10 MG: 10 INJECTION, EMULSION INTRAMUSCULAR; INTRAVENOUS; SUBCUTANEOUS at 12:22

## 2018-01-01 RX ADMIN — LINAGLIPTIN 5 MG: 5 TABLET, FILM COATED ORAL at 08:57

## 2018-01-01 RX ADMIN — MIDODRINE HYDROCHLORIDE 5 MG: 5 TABLET ORAL at 08:05

## 2018-01-01 RX ADMIN — PANTOPRAZOLE SODIUM 8 MG/HR: 40 INJECTION, POWDER, FOR SOLUTION INTRAVENOUS at 19:50

## 2018-01-01 RX ADMIN — BUMETANIDE 2 MG: 1 TABLET ORAL at 08:03

## 2018-01-01 RX ADMIN — MIDODRINE HYDROCHLORIDE 5 MG: 5 TABLET ORAL at 09:05

## 2018-01-01 RX ADMIN — LACTULOSE 20 G: 20 SOLUTION ORAL at 04:27

## 2018-01-01 RX ADMIN — TAMSULOSIN HYDROCHLORIDE 0.4 MG: 0.4 CAPSULE ORAL at 17:03

## 2018-01-01 RX ADMIN — TAMSULOSIN HYDROCHLORIDE 0.4 MG: 0.4 CAPSULE ORAL at 09:56

## 2018-01-01 RX ADMIN — CONIVAPTAN HYDROCHLORIDE 20 MG: 20 INJECTION, SOLUTION INTRAVENOUS at 14:28

## 2018-01-01 RX ADMIN — SODIUM CHLORIDE, POTASSIUM CHLORIDE, SODIUM LACTATE AND CALCIUM CHLORIDE 1000 ML: 600; 310; 30; 20 INJECTION, SOLUTION INTRAVENOUS at 14:07

## 2018-01-01 RX ADMIN — SODIUM CHLORIDE 250 ML: 9 INJECTION, SOLUTION INTRAVENOUS at 12:38

## 2018-01-01 RX ADMIN — INSULIN HUMAN 10 UNITS: 100 INJECTION, SOLUTION PARENTERAL at 04:24

## 2018-01-01 RX ADMIN — TOBRAMYCIN AND DEXAMETHASONE 1 DROP: 3; 1 SUSPENSION/ DROPS OPHTHALMIC at 20:29

## 2018-01-01 RX ADMIN — ALBUMIN (HUMAN) 50 G: 0.25 INJECTION, SOLUTION INTRAVENOUS at 22:16

## 2018-01-01 RX ADMIN — BUMETANIDE 1 MG: 1 TABLET ORAL at 08:24

## 2018-01-01 RX ADMIN — SODIUM CHLORIDE 250 ML: 9 INJECTION, SOLUTION INTRAVENOUS at 17:11

## 2018-01-01 RX ADMIN — TAMSULOSIN HYDROCHLORIDE 0.4 MG: 0.4 CAPSULE ORAL at 16:33

## 2018-01-01 RX ADMIN — Medication 10 ML: at 22:16

## 2018-01-01 RX ADMIN — SPIRONOLACTONE 25 MG: 25 TABLET ORAL at 08:42

## 2018-01-01 RX ADMIN — Medication 0.2 MG: at 15:23

## 2018-01-01 RX ADMIN — ALBUMIN (HUMAN) 25 G: 0.25 INJECTION, SOLUTION INTRAVENOUS at 13:25

## 2018-01-01 RX ADMIN — TAMSULOSIN HYDROCHLORIDE 0.4 MG: 0.4 CAPSULE ORAL at 09:58

## 2018-01-01 RX ADMIN — SODIUM CHLORIDE 250 ML: 9 INJECTION, SOLUTION INTRAVENOUS at 16:38

## 2018-01-01 RX ADMIN — LIDOCAINE HYDROCHLORIDE 5 ML: 20 INJECTION, SOLUTION INFILTRATION; PERINEURAL at 13:49

## 2018-01-01 RX ADMIN — TAMSULOSIN HYDROCHLORIDE 0.4 MG: 0.4 CAPSULE ORAL at 08:54

## 2018-01-01 RX ADMIN — Medication 250 ML: at 13:23

## 2018-01-01 RX ADMIN — TOLVAPTAN 30 MG: 15 TABLET ORAL at 22:00

## 2018-01-01 RX ADMIN — TAMSULOSIN HYDROCHLORIDE 0.4 MG: 0.4 CAPSULE ORAL at 08:24

## 2018-01-01 ASSESSMENT — ENCOUNTER SYMPTOMS
EYE PAIN: 0
COUGH: 0
HEARTBURN: 0
BLURRED VISION: 0
RESPIRATORY NEGATIVE: 1
ANAL BLEEDING: 0
SHORTNESS OF BREATH: 0
WHEEZING: 0
COUGH: 0
CHEST TIGHTNESS: 0
SHORTNESS OF BREATH: 0
SHORTNESS OF BREATH: 0
ABDOMINAL PAIN: 1
BLURRED VISION: 0
COUGH: 0
SORE THROAT: 0
WHEEZING: 0
BACK PAIN: 0
HEARTBURN: 0
VOMITING: 0
PHOTOPHOBIA: 0
DOUBLE VISION: 0
ABDOMINAL PAIN: 0
HEMOPTYSIS: 0
ABDOMINAL PAIN: 0
SORE THROAT: 0
SINUS PAIN: 0
BLURRED VISION: 0
RHINORRHEA: 0
EYES NEGATIVE: 1
DIARRHEA: 0
VOMITING: 0
CONSTIPATION: 0
VOMITING: 0
EYES NEGATIVE: 1
VOMITING: 0
ABDOMINAL DISTENTION: 1
HEMOPTYSIS: 0
BACK PAIN: 0
BLOOD IN STOOL: 0
SHORTNESS OF BREATH: 0
DIARRHEA: 0
VOMITING: 0
DOUBLE VISION: 0
SPUTUM PRODUCTION: 0
PHOTOPHOBIA: 0
PHOTOPHOBIA: 0
NAUSEA: 1
DOUBLE VISION: 0
DIARRHEA: 0
ABDOMINAL DISTENTION: 1
NAUSEA: 0
COUGH: 0
CHEST TIGHTNESS: 0
SPUTUM PRODUCTION: 0
SHORTNESS OF BREATH: 0
BACK PAIN: 0
VOMITING: 0
EYES NEGATIVE: 1
SHORTNESS OF BREATH: 0
SHORTNESS OF BREATH: 0
NAUSEA: 1
SHORTNESS OF BREATH: 0
DIARRHEA: 0
VOMITING: 0
EYE PAIN: 0
HEMOPTYSIS: 0
DOUBLE VISION: 0
EYE DISCHARGE: 1
WHEEZING: 0
RECTAL PAIN: 0
BACK PAIN: 0
ABDOMINAL PAIN: 1
SPUTUM PRODUCTION: 0
ABDOMINAL PAIN: 0
HEARTBURN: 0
SPUTUM PRODUCTION: 0
APNEA: 0
WHEEZING: 0
RESPIRATORY NEGATIVE: 1
COUGH: 0
NAUSEA: 0
CONSTIPATION: 0
NAUSEA: 0
PHOTOPHOBIA: 0
COLOR CHANGE: 1
SHORTNESS OF BREATH: 0
COLOR CHANGE: 0
WHEEZING: 0
SHORTNESS OF BREATH: 0
COUGH: 0
PHOTOPHOBIA: 0
COUGH: 1
ABDOMINAL DISTENTION: 1
DIARRHEA: 0
DOUBLE VISION: 0
DIARRHEA: 0
COUGH: 0
BLURRED VISION: 0
VOMITING: 0
NAUSEA: 0
HEARTBURN: 0
SHORTNESS OF BREATH: 1
RESPIRATORY NEGATIVE: 1
DIARRHEA: 0
NAUSEA: 0
CONSTIPATION: 0
HEARTBURN: 0
CONSTIPATION: 0
NAUSEA: 0
BLURRED VISION: 0
WHEEZING: 0
HEMOPTYSIS: 0
SHORTNESS OF BREATH: 0
BACK PAIN: 0
EYES NEGATIVE: 1
SHORTNESS OF BREATH: 0
BLOOD IN STOOL: 0
ABDOMINAL PAIN: 0
CONSTIPATION: 0
SHORTNESS OF BREATH: 0
ABDOMINAL PAIN: 1
ABDOMINAL PAIN: 1
WHEEZING: 0
CONSTIPATION: 0
BACK PAIN: 1

## 2018-01-01 ASSESSMENT — PAIN SCALES - GENERAL
PAINLEVEL_OUTOF10: 10
PAINLEVEL_OUTOF10: 0
PAINLEVEL_OUTOF10: 5
PAINLEVEL_OUTOF10: 0

## 2018-01-01 ASSESSMENT — PAIN - FUNCTIONAL ASSESSMENT
PAIN_FUNCTIONAL_ASSESSMENT: 0-10

## 2018-01-01 ASSESSMENT — PULMONARY FUNCTION TESTS
PIF_VALUE: 0
PIF_VALUE: 1
PIF_VALUE: 0
PEFR_L/MIN: 100
PIF_VALUE: 0

## 2018-01-01 ASSESSMENT — PAIN DESCRIPTION - ORIENTATION: ORIENTATION: RIGHT

## 2018-01-01 ASSESSMENT — PAIN DESCRIPTION - PAIN TYPE: TYPE: ACUTE PAIN

## 2018-01-01 ASSESSMENT — PAIN DESCRIPTION - LOCATION: LOCATION: GROIN

## 2018-01-03 NOTE — DISCHARGE SUMMARY
Discharge Summary    Patient Identification:  Patient: Mateo Marte  : 1945  MRN: [id number]   Account: [de-identified]     Admit date: 2017  Discharge date: 2017   Attending provider: No att. providers found        Primary care provider: Hortencia Loza DO     History of Present Illness: flank pains ans ascites probable cirrhosis. No alcohol     Admission Diagnoses:  Cirrhosis  DM-type-2 Hypertension COPD     Discharge Diagnoses: Active Hospital Problems    Diagnosis Date Noted    Ascites [R18.8] 2017       Procedures: none    Consults:   IP CONSULT TO INTERNAL MEDICINE  IP CONSULT TO GI     Examination:  Physical Exam On Chart      Significant Diagnostics:   Labs: No results found for this or any previous visit (from the past 72 hour(s)). Radiology: Xr Chest Standard (2 Vw)    Result Date: 2017  EXAMINATION: XR CHEST STANDARD TWO VW  CLINICAL HISTORY: R07.9 Chest pain, unspecified type ICD10 COMPARISONS: 2017  FINDINGS: Two views of the chest are submitted. There are multiple median sternotomy wires The cardiac silhouette is enlarged. Unchanged configuration. The mediastinum is unremarkable. Pulmonary vascular unremarkable. Right sided trachea. There is an area of left lower lobe infiltrate consolidation with small left pleural effusion No effusions. No Pneumothoraces. LOWER LOBE INFILTRATE CONSOLIDATION WITH SMALL LEFT PLEURAL    Mri Abdomen W Wo Contrast    Result Date: 2017  EXAMINATION: MRI ABDOMEN W WO CONTRAST CLINICAL HISTORY: R16.0 Liver mass ICD10. Cirrhosis. Ascites. COMPARISONS: CT abdomen pelvis, 2017 FINDINGS: MRI of the liver was obtained, including imaging before during and after intravenous administration of 21 mL MultiHance.  The examination is abnormal. The liver has cirrhotic morphology, with

## 2018-01-04 PROBLEM — K70.30 ALCOHOLIC CIRRHOSIS OF LIVER WITHOUT ASCITES (HCC): Status: ACTIVE | Noted: 2018-01-01

## 2018-01-04 PROBLEM — K74.60 DIFFUSE NODULAR CIRRHOSIS OF LIVER (HCC): Status: ACTIVE | Noted: 2018-01-01

## 2018-01-12 NOTE — PRE SEDATION
Sedation Pre-Procedure Note    Patient Name: Eve Ortiz   YOB: 1945  Room/Bed: Room/bed info not found  Medical Record Number: [id number]  Date: 1/12/2018   Time: 11:19 AM       Indication:   CT Guided Biopsy of Liver for Diagnosis and Treatment    Consent: I have discussed with the patient and/or the patient representative the indication, alternatives, and the possible risks and/or complications of the planned procedure and the anesthesia methods. The patient and/or patient representative appear to understand and agree to proceed. Vital Signs:   Vitals:    01/12/18 1048   BP: 125/63   Pulse: 82   Resp: 14   Temp: 98.2 °F (36.8 °C)   SpO2: 99%       Past Medical History:   has a past medical history of Atrial fibrillation ([facility] Diabetes mellitus ([facility] Hyperlipidemia; and Hypertension. Past Surgical History:   has a past surgical history that includes egd transoral biopsy single/multiple (N/A, 4/25/2017); Coronary artery bypass graft (2006); hip surgery (Bilateral, 1996); and shoulder surgery (Left). Medications:   Scheduled Meds:    neomycin-bacitracin-polymyxin   Topical Once     Continuous Infusions:    PRN Meds: sodium chloride, sodium chloride, fentanNYL, lidocaine, midazolam  Home Meds:   Prior to Admission medications    Medication Sig Start Date End Date Taking? Authorizing Provider   simvastatin (ZOCOR) 40 MG tablet Take 40 mg by mouth nightly   Yes Historical Provider, MD   glipiZIDE (GLUCOTROL) 5 MG tablet Take 5 mg by mouth daily   Yes Historical Provider, MD   spironolactone (ALDACTONE) 25 MG tablet Take 25 mg by mouth daily 12/19/17  Yes Historical Provider, MD   albuterol sulfate HFA (PROAIR HFA) 108 (90 Base) MCG/ACT inhaler Inhale 2 puffs into the lungs as needed 11/21/12  Yes Historical Provider, MD   warfarin (COUMADIN) 5 MG tablet Take as directed by [facility] [place] [facility]. Quantity equals 90 day supply.   Patient taking differently: Take 2.5 mg by mouth daily Take as directed by [facility] [place] [facility]. Quantity equals 90 day supply. 4/24/17  Yes Maximino Stewart MD   tamsulosin (FLOMAX) 0.4 MG capsule Take 1 capsule by mouth daily  Patient taking differently: Take 0.4 mg by mouth Daily with supper  4/14/16  Yes Alyssia Smith MD   amLODIPine (NORVASC) 5 MG tablet Take 5 mg by mouth daily  12/6/12  Yes Historical Provider, MD   albuterol sulfate  (90 BASE) MCG/ACT inhaler Inhale 2 puffs into the lungs 11/21/12  Yes Historical Provider, MD   linagliptin (TRADJENTA) 5 MG tablet Take 5 mg by mouth daily    Yes Historical Provider, MD   nebivolol (BYSTOLIC) 5 MG tablet Take 10 mg by mouth daily  12/18/12  Yes Historical Provider, MD   bumetanide (BUMEX) 0.5 MG tablet Take 1 mg by mouth 2 times daily    Yes Historical Provider, MD   aspirin 81 MG chewable tablet Take 81 mg by mouth daily    Yes Historical Provider, MD   nitroGLYCERIN (NITROSTAT) 0.4 MG SL tablet Place 0.4 mg under the tongue    Historical Provider, MD     Coumadin Use Last 7 Days:  no  Antiplatelet drug therapy use last 7 days: no  Other anticoagulant use last 7 days: no  Additional Medication Information:  None      Pre-Sedation Documentation and Exam:   I have personally completed a history, physical exam & review of systems for this patient (see notes).     Mallampati Airway Assessment:  Mallampati Class II - (soft palate, fauces & uvula are visible)    Prior History of Anesthesia Complications:   none    ASA Classification:  Class 2 - A normal healthy patient with mild systemic disease    Sedation/ Anesthesia Plan:   intravenous sedation    Medications Planned:   midazolam (Versed) intravenously and fentanyl intravenously    Patient is an appropriate candidate for plan of sedation: yes    Electronically signed by David Montes De Oca NP on 1/12/2018 at 11:19 AM

## 2018-01-12 NOTE — SEDATION DOCUMENTATION
x2 cores extracted with 18G x 11cm Coaxial GTRANno Biopsy System (LOT: [id number]: 2022/02/14) and placed into Formalin. Pt tolerating well. Gelfoam inserted into site by Dr. Gail Shepherd through Introducer Needle. Needle discontinued. Needle and tip appears intact. Gauze and pressure held at the site. Site and surrounding area cleansed. Gelfoam and large Band-Aid applied on top of site. Final CT image obtained.

## 2018-02-01 NOTE — TELEPHONE ENCOUNTER
Gave patient results of Liver Biopsy pathology which just was faxed into office today. See report pathology results below. He was instructed to follow up with Dr. Kunal Ashley as he is scheduled on 2018.                                                 [facility] [address], [place]  [address]                                       [phone]  CORRECTED SURGICAL PATHOLOGY REPORT  Patient Name: Mahnaz Bernal           Accession No:  [id number]   Age Sex:   1945                   Location:      DIS  Account No:   [de-identified]                  Collected:     2018  Med Rec No:    [id number]                   Received:      2018  Attend Phys:                                Completed:     2018  Perform Phys: Anthony Bruce                Date Revised:  2018    CORRECTED DIAGNOSIS:    ADDENDUM FOR THE CONSULT REPORT FROM [facility]:-  THE CONSULT REPORT THIS IS SIMILAR TO OUR ORIGINAL DIAGNOSIS. LIVER, CT-GUIDED CORE BIOPSY-  LIVER TISSUE WITH  PORTAL AND LOBULAR MIXED INFLAMMATION, CENTRILOBULAR  SINUSOIDAL DILATATION AND FOCAL HEPATOCYTE NECROSIS  NOTE:-  THE DIFFERENTIAL DIAGNOSIS INCLUDES VENOUS OUTFLOW OBSTRUCTION AND  ADJACENT  MASS EFFECT.  NO NEOPLASM IS IDENTIFIED IN THE SPECIMEN.  RE  BIOPSY IS RECOMMENDED IF CLINICALLY INDICATED. PLEASE REFER TO THE FULL CONSULT REPORT FROM [facility]. NO CHANGE IN DIAGNOSIS. AMENDED FOR ADDITIONAL IMMUNOSTAINING,  FROM NEW GENOMICS-NEGATIVE. [address] [facility],[address] PENDING    LIVER BIOPSY X2 CORES, CT-GUIDED-  PORTAL INFLAMMATION AND LOBULAR INFLAMMATION WITH  LYMPHOCYTIC  INFILTRATION AND FOCAL NEUTROPHILIC INFILTRATION WITH A FEW LIVER CELL  DROPOUT AND NECROSIS AND SCATTERED BILE DUCT AND DUCTULAR PROLIFERATION.   FOCAL AREAS OF DISTENDED VESSELS  BETWEEN  LOOSE LIVER CORDS WITH  PROMINENT

## 2018-03-03 NOTE — PROGRESS NOTES
with significant weight gain >5 lbs in 1-2 days    -Low sodium diet reviewed    -Avoid excess fluid intake    -F/U if symptoms return or worsen    Follow up:  No Follow-up on file.     SUZANNA Hung

## 2018-06-07 PROBLEM — R18.8 ASCITES OF LIVER: Status: ACTIVE | Noted: 2018-01-01

## 2018-06-07 PROBLEM — E11.9 DIABETES (HCC): Status: ACTIVE | Noted: 2018-01-01

## 2018-06-07 PROBLEM — I51.9 HEART DISEASE (ORGANIC): Status: ACTIVE | Noted: 2018-01-01

## 2018-06-07 PROBLEM — E66.9 OBESITY: Status: ACTIVE | Noted: 2018-01-01

## 2018-06-08 PROBLEM — E87.1 HYPONATREMIA: Status: ACTIVE | Noted: 2018-01-01

## 2018-06-11 PROBLEM — K74.60 CIRRHOSIS (HCC): Status: ACTIVE | Noted: 2018-01-01

## 2018-07-06 NOTE — PROGRESS NOTES
Mr. Rylan Mckenna is a 74 y.o. y/o male with history of Afib who presents today for anticoagulation monitoring and adjustment. INR 4.8 is supratherapeutic for this patient (goal range 2-3) and is reflective of 16.5 mg TWD  Patient verifies current dosing regimen, patient able to verbally recall dose  Patient reports no  missed doses since last INR   Patient denies s/sx clotting and/or stroke  Patient denies hematuria, epistaxis, rectal bleeding  Patient denies changes in  alcohol, or tobacco use  Reviewed medication list and drug allergies with patient, updated any medication additions or modifications accordingly. Pt has been on amoxicillin for dental infection, tx ends in next couple days. Pt is eating much less in the last month to lose weight and will continue this. Patient also denies any pending medical or dental procedures scheduled at this time  Patient was instructed to hold 2 days and decrease TWD 12% to 14.5mg  and RTC 10 days .

## 2018-07-16 NOTE — PROGRESS NOTES
Mr. Maddison Huang is a 74 y.o. y/o male with history of Afib who presents today for anticoagulation monitoring and adjustment.   INR 4 is supra therapeutic for this patient (goal range 2-3) and is reflective of 14.5 mg TWD  Patient verifies current dosing regimen, patient able to verbally recall dose  Patient reports 0  missed doses since last INR   Patient denies s/sx clotting and/or stroke  Patient denies hematuria, epistaxis, rectal bleeding  Patient denies changes in diet, alcohol, or tobacco use  Reviewed medication list and drug allergies with patient, updated any medication additions or modifications accordingly  Patient also denies any pending medical or dental procedures scheduled at this time  Patient was instructed to hold today and RTC 10 days

## 2018-07-21 PROBLEM — D64.9 ANEMIA: Status: ACTIVE | Noted: 2018-01-01

## 2018-07-21 NOTE — PROGRESS NOTES
Admission assessment documented. hipaa code provided. Home meds verbally verified by pts wife Erick Viera. Scab to RFA noted and covered with mepilex, rest of skin intact. Pt also has scattered bruising to BUE/BLE extremities. Pt is up with assistance using a cane. Dr. Colette Coleman was in and he said he ordered the home meds he wants the pt to receive. Falls precautions in place. Hourly rounding explained. cdiff precautions in place per protocol. No other needs noted at this time. Will monitor.  Electronically signed by Jannine Saint, RN on 7/21/2018 at 1:57 PM

## 2018-07-21 NOTE — PROGRESS NOTES
Blood transfusion started. Sat with pt for first 15 min. No reaction suspected or noted. Will monitor.  Electronically signed by Luciano Wilburn RN on 7/21/2018 at 12:47 PM

## 2018-07-21 NOTE — PROGRESS NOTES
Critical HGB of 7.0 called to this RN. Dr. Rekha Moya already aware of low hgb, PRBC ordered.  Electronically signed by Ky Nieves RN on 7/21/2018 at 10:54 AM

## 2018-07-22 NOTE — PROGRESS NOTES
Michelle Thompson is a 74 y.o. male patient. INR elevated given Vitamin-K not told of results last night Hgb better GI to see today    Current Facility-Administered Medications   Medication Dose Route Frequency Provider Last Rate Last Dose    tamsulosin (FLOMAX) capsule 0.4 mg  0.4 mg Oral Daily Uriel Eagle Bescak, DO   0.4 mg at 07/22/18 0854    nitroGLYCERIN (NITROSTAT) SL tablet 0.4 mg  0.4 mg Sublingual Q5 Min PRN Georgeanne Duty, DO        pantoprazole (PROTONIX) 80 mg in sodium chloride 0.9 % 100 mL infusion  8 mg/hr Intravenous Continuous Georgeanne Duty, DO 10 mL/hr at 07/22/18 0626 8 mg/hr at 07/22/18 0626    zolpidem (AMBIEN) tablet 5 mg  5 mg Oral Nightly PRN Georgeanne Duty, DO        acetaminophen (TYLENOL) tablet 650 mg  650 mg Oral Q4H PRN Georgeanne Duty, DO        insulin lispro (HUMALOG) injection vial 0-6 Units  0-6 Units Subcutaneous TID  Georgeanne Duty, DO   1 Units at 07/22/18 1222    insulin lispro (HUMALOG) injection vial 0-3 Units  0-3 Units Subcutaneous Nightly Uriel Weakley Bescak, DO        glucose (GLUTOSE) 40 % oral gel 15 g  15 g Oral PRN Uriel Weakley Bescak, DO        dextrose 50 % solution 12.5 g  12.5 g Intravenous PRN Georgeanne Duty, DO        glucagon (rDNA) injection 1 mg  1 mg Intramuscular PRN Georgeanne Duty, DO        dextrose 5 % solution  100 mL/hr Intravenous PRN [facility] Duty, DO         Allergies   Allergen Reactions    Niacin Rash     Active Problems:    Anemia  Resolved Problems:    * No resolved hospital problems. *    Blood pressure (!) 95/49, pulse 65, temperature 97.7 °F (36.5 °C), resp. rate 18, height 5' 7\" (1.702 m), weight 203 lb (92.1 kg), SpO2 95 %. Subjective:  Symptoms:  Stable. No shortness of breath or chest pressure. Diet:  Adequate intake. Activity level: Impaired due to weakness. Objective:  General Appearance:  Comfortable and ill-appearing.     Vital signs: (most recent): Blood pressure (!) 95/49, pulse 65, temperature 97.7 °F

## 2018-07-23 NOTE — H&P
and  afebrile. HEENT:  Normocephalic. The patient does look pale. Sclerae are  nonicteric, reactive to light. Throat is clear. No exudates. No coating  of mucous membranes. LUNGS:  Show decreased breath sounds at the bases. No wheezing, rales, or  rhonchi. No accessory muscle use. HEART:  Regular. 1/6 systolic murmur. CHEST WALL:  Shows midline healed surgical scar. ABDOMEN:  Soft. No guarding or rigidity. Bowel sounds are present. Tenderness in the epigastric area was noted. EXTREMITIES:  Lower limbs show no edema. SKIN:  Warm and dry. No cyanosis. IMPRESSION:  1. Anemia, etiology uncertain. 2.  Esophageal stricture suspected on esophagram.  3.  Organic heart disease, status post coronary artery bypass grafting. 4.  COPD.  5.  History of need of Coumadin therapy. 6.  Diabetes mellitus type 2.  7.  Hyperlipidemia. 8.  BPH. PLAN:  The patient will be admitted to the hospital with a known hemoglobin  of less than 7. He will get 2 units of blood and started on Protonix. GI  consultation was requested by Dr. Elvin Molina, he was notified. The patient was  taken off Coumadin, given vitamin K subcutaneously. Most medications from  home were held. Daily weights. Follow BMP daily in addition to INR.       Alea Keene DO    D: 07/22/2018 15:43:58       T: 07/22/2018 15:47:01     [email or web address]  Job#: [id number]     Doc#: [id number]    CC:

## 2018-07-23 NOTE — PROGRESS NOTES
Nutrition Assessment    Type and Reason for Visit: Initial, Positive Nutrition Screen    Nutrition Recommendations:    Start Clear liquid ONS TID  Recommend Swallow Evaluation for diet consistency recommendations. Malnutrition Assessment:  · Malnutrition Status: No malnutrition  · Context: Acute illness or injury  · Findings of the 6 clinical characteristics of malnutrition (Minimum of 2 out of 6 clinical characteristics is required to make the diagnosis of moderate or severe Protein Calorie Malnutrition based on AND/ASPEN Guidelines):  1. Energy Intake-Greater than 75%, greater than 7 days    2. Weight Loss-No significant weight loss,    3. Fat Loss-No significant subcutaneous fat loss,    4. Muscle Loss-No significant muscle mass loss,    5. Fluid Accumulation-No significant fluid accumulation,    6.  Strength-Normal    Nutrition Diagnosis:   · Problem: Inadequate oral intake  · Etiology: related to Alteration in GI function (esophageal stricture)     Signs and symptoms:  as evidenced by  (current diet order)    Nutrition Assessment:  · Subjective Assessment: pt admitted with low hgb., suspected esophageal stricture. Has been having difficulty swallowing solid foods x 3 weeks pta. Foods feel like they were getting 'stuck'. Pt has been eating soft foods at home  · Nutrition-Focused Physical Findings: I/O: 930/1550. Peripheral IV. No edema per nursing.   BM (7/21)  · Wound Type: None  · Current Nutrition Therapies:  · Oral Diet Orders: Clear Liquid   · Oral Diet intake: %  · Oral Nutrition Supplement (ONS) Orders: None  · Anthropometric Measures:  · Ht: 5' 7\" (170.2 cm)   · Current Body Wt:    · Admission Body Wt: 203 lb (92.1 kg) (stated)  · Usual Body Wt: 200 lb (90.7 kg)  · % Weight Change: 0,     · Ideal Body Wt: 148 lb (67.1 kg), % Ideal Body > 100%  · BMI Classification: BMI 30.0 - 34.9 Obese Class I  · Comparative Standards (Estimated Nutrition Needs):  · Estimated Daily Total Kcal:

## 2018-07-23 NOTE — PROGRESS NOTES
Austyn Carey is a 74 y.o. male patient.scheduled to have EGD once INR stable. No issues. Denies SOB  BP on low side no symptoms Abnormal esophogram stricture    Current Facility-Administered Medications   Medication Dose Route Frequency Provider Last Rate Last Dose    tamsulosin (FLOMAX) capsule 0.4 mg  0.4 mg Oral Daily Gordan Barmith Bescak, DO   0.4 mg at 07/23/18 0958    nitroGLYCERIN (NITROSTAT) SL tablet 0.4 mg  0.4 mg Sublingual Q5 Min PRN Megha Calk, DO        pantoprazole (PROTONIX) 80 mg in sodium chloride 0.9 % 100 mL infusion  8 mg/hr Intravenous Continuous Megha Calk, DO 10 mL/hr at 07/23/18 0550 8 mg/hr at 07/23/18 0550    zolpidem (AMBIEN) tablet 5 mg  5 mg Oral Nightly PRN Megha Calk, DO        acetaminophen (TYLENOL) tablet 650 mg  650 mg Oral Q4H PRN Megha Calk, DO        insulin lispro (HUMALOG) injection vial 0-6 Units  0-6 Units Subcutaneous TID WC Megha Calk, DO   2 Units at 07/23/18 1211    insulin lispro (HUMALOG) injection vial 0-3 Units  0-3 Units Subcutaneous Nightly Megha Calk, DO   2 Units at 07/22/18 2205    glucose (GLUTOSE) 40 % oral gel 15 g  15 g Oral PRN Apolloan Barmith Bescak, DO        dextrose 50 % solution 12.5 g  12.5 g Intravenous PRN Megha Calk, DO        glucagon (rDNA) injection 1 mg  1 mg Intramuscular PRN Megha Calk, DO        dextrose 5 % solution  100 mL/hr Intravenous PRN Megha Calk, DO         Allergies   Allergen Reactions    Niacin Rash     Active Problems:    Anemia  Resolved Problems:    * No resolved hospital problems. *    Blood pressure (!) 101/47, pulse 63, temperature 97.2 °F (36.2 °C), temperature source Oral, resp. rate 16, height 5' 7\" (1.702 m), weight 203 lb (92.1 kg), SpO2 100 %. Subjective:  Symptoms:  Stable. He reports weakness. No shortness of breath or chest pressure. Diet:  Adequate intake. Activity level: Impaired due to weakness.       Objective:  General Appearance:  Comfortable and in

## 2018-07-24 NOTE — PROGRESS NOTES
Spoke with Aylin Boyer on the phone who stated that patients INR has to be 1.5 or less for him to do procedure. Vitamin K po and SQ to be given again today. Orders placed.   Electronically signed by John Siegel RN on 7/24/2018 at 10:07 AM

## 2018-07-25 NOTE — PROGRESS NOTES
Vitamin K was given this AM, PO and subQ. Patient denies any pain/ SOB/ or dizziness at this time. Wife at bedside. Patient informed of plan to redraw labs tomorrow AM and reassess when procedure can be scheduled.  Electronically signed by Carlos Medina RN on 7/25/2018 at 2:31 PM

## 2018-07-26 NOTE — CARE COORDINATION
PATIENT FROM HOME- PLAN IS TO RETURN SAME-- INR IS 2 TODAY- SPOKE WITH DR MAURICE- INR STILL NEEDS TO DECREASE BEFORE THEY CAN DO EGD/ LH

## 2018-07-27 NOTE — OP NOTE
gastropathy. Recommendations: Will start feeding the patient and if symptoms recurs may need esophageal manometry.     Lucita Nino MD  [facility]

## 2018-07-27 NOTE — ANESTHESIA POSTPROCEDURE EVALUATION
Department of Anesthesiology  Postprocedure Note    Patient: Hermelindo Carrizales  MRN: [id number]  YOB: 1945  Date of evaluation: 7/27/2018  Time:  3:40 PM     Procedure Summary     Date:  07/27/18 Room / Location:  [facility] / [facility] OR    Anesthesia Start:  3403 Anesthesia Stop:  6595    Procedure:  EGD ESOPHAGOGASTRODUODENOSCOPY (N/A ) Diagnosis:  (INPATIENT)    Surgeon:  Abelardo Cassidy MD Responsible Provider:  Ethel Hudson MD    Anesthesia Type:  MAC ASA Status:  4          Anesthesia Type: MAC    Darek Phase I:      Darek Phase II:      Last vitals: Reviewed and per EMR flowsheets.        Anesthesia Post Evaluation    Patient location during evaluation: bedside  Patient participation: waiting for patient participation  Level of consciousness: sleepy but conscious  Pain score: 0  Airway patency: patent  Nausea & Vomiting: no nausea and no vomiting  Complications: no  Cardiovascular status: blood pressure returned to baseline and hemodynamically stable  Respiratory status: acceptable  Hydration status: euvolemic

## 2018-07-27 NOTE — ANESTHESIA PRE PROCEDURE
Department of Anesthesiology  Preprocedure Note       Name:  Dez Lizama   Age:  74 y.o.  :  1945                                          MRN:  [id number]         Date:  2018      Surgeon: Eula Henderson):  Gaetano Rivas MD    Procedure: Procedure(s):  EGD ESOPHAGOGASTRODUODENOSCOPY    Medications prior to admission:   Prior to Admission medications    Medication Sig Start Date End Date Taking? Authorizing Provider   warfarin (COUMADIN) 1 MG tablet Take as directed by [facility]. Quantity equals 90 day supply. Patient taking differently: Take 1 mg by mouth Take as directed by [facility]. Quantity equals 90 day supply.     On 18  Yes Lurdes Mae MD   vitamin B-12 (CYANOCOBALAMIN) 1000 MCG tablet Take 1,000 mcg by mouth daily   Yes Historical Provider, MD   simvastatin (ZOCOR) 40 MG tablet Take 40 mg by mouth nightly   Yes Historical Provider, MD   spironolactone (ALDACTONE) 25 MG tablet Take 25 mg by mouth daily 17  Yes Historical Provider, MD   albuterol sulfate HFA (PROAIR HFA) 108 (90 Base) MCG/ACT inhaler Inhale 2 puffs into the lungs as needed 12  Yes Historical Provider, MD   tamsulosin (FLOMAX) 0.4 MG capsule Take 1 capsule by mouth daily  Patient taking differently: Take 0.4 mg by mouth Daily with supper  16  Yes Roxanne Srivastava MD   nitroGLYCERIN (NITROSTAT) 0.4 MG SL tablet Place 0.4 mg under the tongue every 5 minutes as needed    Yes Historical Provider, MD   linagliptin (TRADJENTA) 5 MG tablet Take 5 mg by mouth daily    Yes Historical Provider, MD   nebivolol (BYSTOLIC) 5 MG tablet Take 5 mg by mouth daily  12  Yes Historical Provider, MD   bumetanide (BUMEX) 0.5 MG tablet Take 1 mg by mouth 2 times daily    Yes Historical Provider, MD   aspirin 81 MG chewable tablet Take 81 mg by mouth three times a week Monday, Wednesday, and Friday   Yes Historical Provider, MD chlorthalidone (HYGROTON) 25 MG tablet Take 25 mg by mouth daily 6/12/18   Historical Provider, MD   warfarin (COUMADIN) 5 MG tablet Take as directed by [facility] [place] [facility]. Quantity equals 90 day supply. Patient taking differently: Take 2 mg by mouth Six times weekly Take as directed by [facility] [place] [facility]. Quantity equals 90 day supply.     Monday, Tuesday, Thursday, Friday, Saturday, Sunday    [facility] 2/2/18   Gilma Ty MD   glipiZIDE (GLUCOTROL) 5 MG tablet Take 5 mg by mouth daily    Historical Provider, MD   amLODIPine (NORVASC) 5 MG tablet Take 5 mg by mouth daily  12/6/12   Historical Provider, MD       Current medications:    Current Facility-Administered Medications   Medication Dose Route Frequency Provider Last Rate Last Dose    0.9 % sodium chloride bolus  250 mL Intravenous Once John Spaulding MD        sodium chloride 0.9 % infusion             fentaNYL (SUBLIMAZE) injection 50 mcg  50 mcg Intravenous Q10 Min PRN Gayle Kirk MD        HYDROmorphone (DILAUDID) injection 0.5 mg  0.5 mg Intravenous Q10 Min PRN Gayle Kirk MD        HYDROcodone-acetaminophen [facility]) 5-325 MG per tablet 1 tablet  1 tablet Oral PRN Gayle Kirk MD        Or    HYDROcodone-acetaminophen [facility]) 5-325 MG per tablet 2 tablet  2 tablet Oral PRN Gayle Kirk MD        diphenhydrAMINE (BENADRYL) injection 12.5 mg  12.5 mg Intravenous Once PRN Gayle Kirk MD        ondansetron [facility]) injection 4 mg  4 mg Intravenous Once PRN Gayle Kirk MD        metoclopramide [facility]) injection 10 mg  10 mg Intravenous Once PRLAYNE Kirk MD        meperidine (DEMEROL) injection 12.5 mg  12.5 mg Intravenous Q5 Min PRLAYNE Kirk MD        0.9 % sodium chloride bolus  250 mL Intravenous Once Erin Carrillo, DO 20 mL/hr at 07/27/18 1310 250 mL at 07/27/18 1310    pantoprazole (PROTONIX) tablet 40 mg  40 mg Oral QAM AC Jacqualin Mood Bescak, DO   40 mg at 07/27/18 2963    spironolactone (ALDACTONE) tablet 25 mg  25 mg Oral Daily Jacqualin Mood Bescak, DO   25 mg at 07/26/18 8743    tamsulosin (FLOMAX) capsule 0.4 mg  0.4 mg Oral Daily Robert [place], DO   0.4 mg at 07/26/18 7759    nitroGLYCERIN (NITROSTAT) SL tablet 0.4 mg  0.4 mg Sublingual Q5 Min PRN Robert [place], DO        zolpidem (AMBIEN) tablet 5 mg  5 mg Oral Nightly PRN Robert [place], DO   5 mg at 07/26/18 2234    acetaminophen (TYLENOL) tablet 650 mg  650 mg Oral Q4H PRN Robert [place], DO        insulin lispro (HUMALOG) injection vial 0-6 Units  0-6 Units Subcutaneous TID WC Robert [place], DO   4 Units at 07/27/18 1159    insulin lispro (HUMALOG) injection vial 0-3 Units  0-3 Units Subcutaneous Nightly Robert [place], DO   3 Units at 07/26/18 2101    glucose (GLUTOSE) 40 % oral gel 15 g  15 g Oral PRN Robert [place], DO        dextrose 50 % solution 12.5 g  12.5 g Intravenous PRN Robert [place], DO        glucagon (rDNA) injection 1 mg  1 mg Intramuscular PRN Robert [place], DO        dextrose 5 % solution  100 mL/hr Intravenous PRN Robert [place], DO           Allergies:     Allergies   Allergen Reactions    Niacin Rash       Problem List:    Patient Active Problem List   Diagnosis Code    Atrial fibrillation ([facility] I48.91    Dysphagia R13.10    Ascites S81.9    Alcoholic cirrhosis of liver without ascites ([facility] K70.30    Diffuse nodular cirrhosis of liver (HCC) K74.60    Diabetes ([facility] E11.9    Obesity E66.9    Ascites of liver R18.8    Heart disease (organic) I51.9    Hyponatremia E87.1    Cirrhosis ([facility] K74.60    Anemia D64.9       Past Medical History:        Diagnosis Date    Atrial fibrillation ([facility]     Diabetes mellitus ([facility]     Hx of cirrhosis     Hyperlipidemia     Hypertension        Past Surgical History:        Procedure Laterality Date    CORONARY ARTERY BYPASS GRAFT

## 2018-07-27 NOTE — PROGRESS NOTES
Total Kcal: 2220-9340  · Estimated Daily Protein (g): 80-87    Estimated Intake vs Estimated Needs: Intake Improving    Nutrition Risk Level: Moderate    Nutrition Interventions:   Modify current ONS  Continued Inpatient Monitoring, Education declined (pt denies diet education needs for coumadin)    Nutrition Evaluation:   · Evaluation: Goals set   · Goals: Intake of Meals/Ons's Will Be > 75%. · Monitoring: Meal Intake, Supplement Intake, Weight, Pertinent Labs, Fluid Balance, Chewing/Swallowing    See Adult Nutrition Doc Flowsheet for more detail.      Electronically signed by Ed Nick RD, LD on 7/27/18 at 6:58 PM    Contact Number: [id number]

## 2018-07-27 NOTE — PLAN OF CARE
Problem: Nutrition  Goal: Optimal nutrition therapy  Nutrition Problem: Inadequate oral intake, in context of acute illness or injury   Intervention: Food and/or Nutrient Delivery: Modify current ONS  Nutritional Goals: Intake of Meals/Ons's Will Be > 75%.    Outcome: Ongoing

## 2018-07-28 NOTE — PROGRESS NOTES
resolved hospital problems. *    Blood pressure (!) 102/51, pulse 68, temperature 97.9 °F (36.6 °C), temperature source Oral, resp. rate 20, height 5' 7\" (1.702 m), weight 203 lb (92.1 kg), SpO2 100 %. Subjective:  Symptoms:  Stable. He reports weakness. No shortness of breath. Diet:  Adequate intake. Objective:  General Appearance:  Comfortable, ill-appearing and in no acute distress. Vital signs: (most recent): Blood pressure (!) 102/51, pulse 68, temperature 97.9 °F (36.6 °C), temperature source Oral, resp. rate 20, height 5' 7\" (1.702 m), weight 203 lb (92.1 kg), SpO2 100 %. Output: Producing urine. Lungs:  Normal effort. There are decreased breath sounds. Heart: Normal rate. Positive for murmur. Abdomen: Abdomen is distended. Hypoactive bowel sounds. There is no abdominal tenderness. Extremities: There is dependent edema. Pulses: There are decreased pulses. Neurological: Patient is alert.       Assessment & Plan   Hyponatremia\                                            Cirrhosis                                     COPD                                     OHDx     Plan samsca 15 mg today and 3% saline  Check labs tonight possible discharge  Lisbeth King DO  7/28/2018

## 2018-07-28 NOTE — PLAN OF CARE
Problem: Falls - Risk of:  Goal: Will remain free from falls  Will remain free from falls   Outcome: Ongoing    Goal: Absence of physical injury  Absence of physical injury   Outcome: Ongoing      Problem: BLEEDING PRECAUTIONS  Goal: Knowledge of bleeding precautions  Outcome: Ongoing      Problem: Nutrition  Goal: Optimal nutrition therapy  Nutrition Problem: Inadequate oral intake, in context of acute illness or injury   Intervention: Food and/or Nutrient Delivery: Modify current ONS  Nutritional Goals: Intake of Meals/Ons's Will Be > 75%.     Outcome: Ongoing

## 2018-07-29 NOTE — PROGRESS NOTES
Juan Jose Cobos is a 74 y.o. male patient. in no distress. Serum sodium still low. Will repeat hypertonic saline.  No GIB    Current Facility-Administered Medications   Medication Dose Route Frequency Provider Last Rate Last Dose    0.9 % sodium chloride infusion   Intravenous Continuous Corinn Daughters, DO   Stopped at 07/29/18 0029    sodium chloride 3 % solution  50 mL/hr Intravenous Continuous Corinn Daughters, DO 50 mL/hr at 07/29/18 1032 50 mL/hr at 07/29/18 1032    albumin human 25 % solution 50 g  50 g Intravenous Once Corinn Daughters, DO        midodrine (PROAMATINE) tablet 5 mg  5 mg Oral TID  Orgelio High Bescak, DO        0.9 % sodium chloride infusion   Intravenous Continuous Selvin Dorsey  mL/hr at 07/28/18 2327      sodium chloride (PF) 0.9 % injection 10 mL  10 mL Intravenous 2 times per day Yousif Richard MD   10 mL at 07/28/18 2157    sodium chloride (PF) 0.9 % injection 10 mL  10 mL Intravenous PRN Yousif Richard MD        [facility]) capsule 0.4 mg  0.4 mg Oral Daily Corinn Daughters, DO   0.4 mg at 07/29/18 9464    nitroGLYCERIN (NITROSTAT) SL tablet 0.4 mg  0.4 mg Sublingual Q5 Min PRN Corinn Daughters, DO        zolpidem (AMBIEN) tablet 5 mg  5 mg Oral Nightly PRN Corinn Daughters, DO   5 mg at 07/26/18 2234    acetaminophen (TYLENOL) tablet 650 mg  650 mg Oral Q4H PRN Corinn Daughters, DO        insulin lispro (HUMALOG) injection vial 0-6 Units  0-6 Units Subcutaneous TID WC Corinn Daughters, DO   1 Units at 07/28/18 1722    insulin lispro (HUMALOG) injection vial 0-3 Units  0-3 Units Subcutaneous Nightly Corinn Daughters, DO   1 Units at 07/28/18 2155    glucose (GLUTOSE) 40 % oral gel 15 g  15 g Oral PRN Corinn Daughters, DO        dextrose 50 % solution 12.5 g  12.5 g Intravenous PRN Corinn Daughters, DO        glucagon (rDNA) injection 1 mg  1 mg Intramuscular PRN Corinn Daughters, DO         Allergies   Allergen Reactions    Niacin Rash     Active Problems: Anemia  Resolved Problems:    * No resolved hospital problems. *    Blood pressure 133/66, pulse 69, temperature 97.9 °F (36.6 °C), temperature source Oral, resp. rate 18, height 5' 7\" (1.702 m), weight 203 lb (92.1 kg), SpO2 100 %. Subjective:  Symptoms:  Stable. No shortness of breath or chest pain. Diet:  Adequate intake. Activity level: Impaired due to weakness. Objective:  General Appearance:  Comfortable and in no acute distress. Vital signs: (most recent): Blood pressure 133/66, pulse 69, temperature 97.9 °F (36.6 °C), temperature source Oral, resp. rate 18, height 5' 7\" (1.702 m), weight 203 lb (92.1 kg), SpO2 100 %. Vital signs are normal.    Output: Producing urine. Lungs:  Normal effort and normal respiratory rate. There are decreased breath sounds. Heart: Normal rate. Positive for murmur. Abdomen: Abdomen is soft. There are signs of ascites. Hypoactive bowel sounds. There is no abdominal tenderness. Extremities: There is dependent edema. Pulses: There are decreased pulses. Neurological: Patient is alert and oriented to person, place and time.       Assessment & Plan  Hyponatremia  D/t cirrhosis                                    Liver nodules monitored by Dr Esteban LIZAMA type-2                                    COPD     Plan samsca one dose with 3% saline check lab this afternoon    Chapin Carrera DO  7/29/2018

## 2018-07-29 NOTE — CARE COORDINATION
MET WITH PATIENT AND WIFE AGAIN, STATES PLAN IS HOME WHEN MEDICALLY STABLE, SODIUM REMAINS LOW, TREATING WITH SAMSCA, ALBUMIN, 3%NS. DENIES DC NEEDS.

## 2018-07-29 NOTE — PROGRESS NOTES
Spoke to dr. Willie Tipton about pt hr being in the 130's. Pt stays in the 130's for a while then drops back down to low 100's. Dr. Willie Tipton placed no new orders at this time. Will continue to monitor pt.   Electronically signed by Lilian Dubin, RN on 7/29/2018 at 5:28 AM

## 2018-07-29 NOTE — PROGRESS NOTES
Dr. Nohemi De Guzman ordered for pt to receive sodium chloride 3% to start this evening. Waiting for lab to send up so this can be started.    Electronically signed by Vivian Virgen RN on 7/29/2018 at 1:06 AM

## 2018-07-29 NOTE — PROGRESS NOTES
Spoke to dr. Agustin Shahid who ordered for pt to have normal saline to run iv. Will recheck sodium level in the morning.    Electronically signed by Kiara Sanchez RN on 7/28/2018 at 11:13 PM

## 2018-07-29 NOTE — PROGRESS NOTES
Assessment completed this shift. Pt alert and oriented x4. Denies pain or nausea. BLE dark discolred with +2 pitting edema. Pt ip in chair. Call light in reach.   Electronically signed by Vince Nance RN on 7/29/2018 at 9:24 AM

## 2018-07-29 NOTE — PROGRESS NOTES
problems. *    Blood pressure 133/66, pulse 69, temperature 97.9 °F (36.6 °C), temperature source Oral, resp. rate 18, height 5' 7\" (1.702 m), weight 203 lb (92.1 kg), SpO2 100 %. Subjective no dysphagia now,tolerating the meals  Objective persistent hyponitremia  Assessment & Plan cirrhosis with dilutional hyponitremia,tumour markers still pending,i spoke to lab regarding this.     Kameron Marquez MD  7/29/2018

## 2018-07-30 NOTE — PROGRESS NOTES
Spoke to dr. Alvaro Alexander about sodium level being 115.  is aware and stated he will place orders shortly. Will continue to monitor pt.   Electronically signed by Lilian Dubin, RN on 7/29/2018 at 8:44 PM

## 2018-07-30 NOTE — CARE COORDINATION
Discharge plan remains to discharge home with spouse. No needs identified. Will follow as needs arise.

## 2018-07-30 NOTE — PROGRESS NOTES
Lab called with a critical sodium of 115. Paged dr. Octavia Aguila, waiting for him to call back. Will continue to monitor pt.   Electronically signed by Vesta Licea RN on 7/29/2018 at 8:38 PM

## 2018-07-30 NOTE — PROGRESS NOTES
Gave pt Samsca 30mg at 2200 this evening per dr. Desouza Him order. Pt dropped the first dose given on the floor. Called pharmacy who sent up 30 more mg. Received Samsca from pharmacy. Pt took 15mg and then dropped the second 15mg on the floor. Called pharmacy who sent up one more 15mg tab of Samsca. Pt took second 15mg tab without difficulty. Sodium levels will be redrawn again in morning.    Electronically signed by Shana Britt RN on 7/30/2018 at 2:51 AM

## 2018-07-31 NOTE — PROGRESS NOTES
Oral, resp. rate 18, height 5' 7\" (1.702 m), weight 203 lb (92.1 kg), SpO2 100 %. Subjective:  Symptoms:  Stable. He reports chest pain and weakness. Diet:  Adequate intake. Objective:  General Appearance:  Comfortable and in no acute distress. Vital signs: (most recent): Blood pressure (!) 112/42, pulse 77, temperature 98.1 °F (36.7 °C), temperature source Oral, resp. rate 18, height 5' 7\" (1.702 m), weight 203 lb (92.1 kg), SpO2 100 %. Vital signs are normal.  No fever. Output: Producing urine. Lungs:  Normal effort. Breath sounds clear to auscultation. There are decreased breath sounds. Heart: Normal rate. Regular rhythm. Abdomen: Abdomen is soft and distended. There is no abdominal tenderness. Neurological: Patient is alert and oriented to person, place and time.       Assessment & Plan      Hyponatremia refractory d/t ascites cirrhosis                                        Anemia stable                                        DM type-2                                        OHDxAF                                        Ascites       Plan orders written  pericentesis check fluid  DrJain to give future direction for treatment and explain prognosis to family and patient about cirrhosis    Erin Carrlilo DO  7/31/2018

## 2018-07-31 NOTE — PLAN OF CARE
Problem: Nutrition  Goal: Optimal nutrition therapy  Outcome: Ongoing  Nutrition Problem: No nutrition diagnosis at this time  Intervention: Food and/or Nutrient Delivery: Continue current diet, Continue current ONS (Carb Control 4, 1500 FR, Diabetic ONS x2 ( strawbery) , Pt wishes to continue)  Nutritional Goals: po > 75%

## 2018-07-31 NOTE — CONSULTS
MD   albuterol sulfate HFA (PROAIR HFA) 108 (90 Base) MCG/ACT inhaler Inhale 2 puffs into the lungs as needed 11/21/12  Yes Historical Provider, MD   tamsulosin (FLOMAX) 0.4 MG capsule Take 1 capsule by mouth daily  Patient taking differently: Take 0.4 mg by mouth Daily with supper  4/14/16  Yes Aquiles Penaloza MD   nitroGLYCERIN (NITROSTAT) 0.4 MG SL tablet Place 0.4 mg under the tongue every 5 minutes as needed    Yes Historical Provider, MD   linagliptin (TRADJENTA) 5 MG tablet Take 5 mg by mouth daily    Yes Historical Provider, MD   nebivolol (BYSTOLIC) 5 MG tablet Take 5 mg by mouth daily  12/18/12  Yes Historical Provider, MD   bumetanide (BUMEX) 0.5 MG tablet Take 1 mg by mouth 2 times daily    Yes Historical Provider, MD   aspirin 81 MG chewable tablet Take 81 mg by mouth three times a week Monday, Wednesday, and Friday   Yes Historical Provider, MD   chlorthalidone (HYGROTON) 25 MG tablet Take 25 mg by mouth daily 6/12/18   Historical Provider, MD   warfarin (COUMADIN) 5 MG tablet Take as directed by [place] [facility]. Quantity equals 90 day supply. Patient taking differently: Take 2 mg by mouth Six times weekly Take as directed by [place] [facility]. Quantity equals 90 day supply. Monday, Tuesday, Thursday, Friday, Saturday, Sunday    NOT [facility] 2/2/18   Jessica Kenney MD   glipiZIDE (GLUCOTROL) 5 MG tablet Take 5 mg by mouth daily    Historical Provider, MD   amLODIPine (NORVASC) 5 MG tablet Take 5 mg by mouth daily  12/6/12   Historical Provider, MD        Family History:   Family History   Problem Relation Age of Onset    Hypertension Mother     Diabetes Mother     Cancer Sister         lung    Heart Disease Father       Social History:    Social History     Social History    Marital status:      Spouse name: N/A    Number of children: N/A    Years of education: N/A     Occupational History    Not on file. Social History Main Topics    Smoking status: Former Smoker     Types: Cigars    Smokeless tobacco: Never Used    Alcohol use No    Drug use: No    Sexual activity: Not on file     Other Topics Concern    Not on file     Social History Narrative    No narrative on file        Review of Systems:  Review of Systems   Constitutional: Positive for malaise/fatigue. Negative for chills, diaphoresis, fever and weight loss. HENT: Negative. Negative for congestion, ear pain, hearing loss and tinnitus. Eyes: Negative. Negative for blurred vision, double vision and photophobia. Respiratory: Positive for cough. Negative for hemoptysis, sputum production, shortness of breath and wheezing. Cardiovascular: Negative. Negative for chest pain, palpitations, claudication and leg swelling. Gastrointestinal: Positive for abdominal pain and nausea. Negative for constipation, diarrhea, heartburn and vomiting. Genitourinary: Negative. Negative for dysuria, flank pain, frequency, hematuria and urgency. Musculoskeletal: Negative. Negative for back pain, joint pain and neck pain. Skin: Negative. Negative for itching and rash. Neurological: Positive for weakness. Negative for dizziness, tingling, tremors, sensory change and headaches. Endo/Heme/Allergies: Negative. Negative for environmental allergies. Does not bruise/bleed easily. Psychiatric/Behavioral: Negative. Negative for depression, hallucinations, substance abuse and suicidal ideas. The patient is not nervous/anxious. Physical Examination:      Physical Exam   Constitutional: He is oriented to person, place, and time. He appears well-developed and well-nourished. No distress. HENT:   Head: Normocephalic and atraumatic. Right Ear: External ear normal.   Left Ear: External ear normal.   Nose: Nose normal.   Mouth/Throat: Oropharynx is clear and moist. No oropharyngeal exudate. Eyes: Pupils are equal, round, and reactive to light. Right eye exhibits no discharge. Left eye exhibits no discharge. Scleral icterus is present. Neck: Neck supple. No JVD present. No tracheal deviation present. No thyromegaly present. Cardiovascular: Normal rate, regular rhythm, normal heart sounds and intact distal pulses. Exam reveals no gallop and no friction rub. No murmur heard. Pulmonary/Chest: No stridor. No respiratory distress. He has no wheezes. He has no rales. He exhibits no tenderness. Abdominal: Soft. Bowel sounds are normal. He exhibits distension. He exhibits no mass. There is tenderness. There is no rebound and no guarding. No hernia. Musculoskeletal: He exhibits no edema, tenderness or deformity. Lymphadenopathy:     He has no cervical adenopathy. Neurological: He is alert and oriented to person, place, and time. Skin: Skin is dry. No rash noted. He is not diaphoretic. No erythema. No pallor. Psychiatric: He has a normal mood and affect. His behavior is normal. Judgment and thought content normal.         ASSESSMENT AND PLAN:     ASSESSMENT: Progressive abdominal ascites fluid with elevated INR    PLAN: Ultrasound-guided paracentesis for diagnostic and therapeutic purposes. We'll administer fresh frozen plasma during procedure. Pearl Cazares.  Jhon Rivera Poag

## 2018-08-01 NOTE — CARE COORDINATION
MET WITH PATIENT, FREEDOM OF CHOICE OFFERED,  [facility] STRENGTHENING WITH PT/OT. ORDER CONFIRMED, CALLED TO INTAKE IN [place]. PLAN IS DC TO HOME TODAY.

## 2018-08-01 NOTE — PROGRESS NOTES
lb (92.1 kg), SpO2 100 %. Subjective:  Symptoms:  Stable. No shortness of breath or chest pain. Diet:  Adequate intake. Activity level: Impaired due to weakness. Pain:  He reports no pain. Objective:  General Appearance:  Ill-appearing. Vital signs: (most recent): Blood pressure (!) 114/51, pulse 94, temperature 98.8 °F (37.1 °C), temperature source Oral, resp. rate 18, height 5' 7\" (1.702 m), weight 203 lb (92.1 kg), SpO2 100 %. Vital signs are normal.  No fever. Output: Producing urine. Lungs:  Normal effort and normal respiratory rate. There are decreased breath sounds. Heart: Normal rate. Regular rhythm. Positive for murmur. Abdomen: Abdomen is soft. Hypoactive bowel sounds. There is no abdominal tenderness. Extremities: There is dependent edema. Pulses: There are decreased pulses. Neurological: Patient is alert and oriented to person, place and time. Skin:  Warm. Assessment & Plan  Hyponatremia resolved d/t cirrhosis                                   Fatty Liver Cirrhosis                                   DM type 2                                   Obesity                                   COPD                                   OHDx AF                                   Anemia            Plan Venofir IV before leaving home health and physical therapy.  Dr Suzi Workman to see in office next week lab on [facility], DO  8/1/2018

## 2018-08-01 NOTE — PROGRESS NOTES
Pt had multiple incontinent episodes of urine throughout the night. Pts vital signs remained stable. Falls precautions in place. Call light within reach.

## 2018-08-02 NOTE — CARE COORDINATION
Agnes 45 Transitions Initial Follow Up Call    Call within 2 business days of discharge: Yes    Patient: Yaquelin Carballo Patient : 1945   MRN: [id number]  Reason for Admission: [phone] Hyponatremia r/t cirrhosis; Fatty liver cirrhosis; Anemia; Dysphagia  Discharge Date: 18 RARS: Readmission Risk Score: 15 CM 9  PHP Plan: [facility]  PCP: Gus Ashley DO     Spoke with: Rajinder Estevez reports he continues to have generalized muscle weakness. He feels more weakness in the arms than the legs. He is fatigued but active in the home today. Family is very supportive. He ic current w/ [facility] and was seen today. They will do lab draws and INR next Monday. Today's BS was 130 and /62. Pt states he is swallowing without cough or difficulty. Reviewed symptoms of hyponatremia w/ pt/family. Discussed symptoms to report to physician vs seek emergent attention for. Restates understanding. Has all his meds & no current cost concerns. Denies any home needs/concerns. TC appt PCP  9:30. FU Dr Ligia Hernandez  2:00. Med rec & 1111F  completed. CHANGE how you take:  warfarin (COUMADIN)\   STOP taking:  amLODIPine 5 MG tablet (NORVASC)  aspirin 81 MG chewable tablet  chlorthalidone 25 MG tablet (HYGROTON)  glipiZIDE 5 MG tablet (GLUCOTROL)  nebivolol 5 MG tablet (BYSTOLIC)  simvastatin 40 MG tablet (ZOCOR)    Non-face-to-face services provided:  Scheduled appointment with Specialist-Dr Ligia Hernandez  2:00  Obtained and reviewed discharge summary and/or continuity of care documents  Education of patient/family/caregiver/guardian to support self-management-Reviewed s/s hyponatremia.     Care Transitions 24 Hour Call    Schedule Follow Up Appointment with PCP:  Completed  Do you have any ongoing symptoms?:  Yes  Patient-reported symptoms:  Fatigue, Weakness  Do you have a copy of your discharge instructions?:  Yes  Do you have all of your prescriptions and are they filled?:  Yes  Have you been contacted by a

## 2018-08-10 NOTE — PROGRESS NOTES
Mr. Marixa Alarcon is a 74 y.o. y/o male with history of Afib who is enrolled in the [facility]) through the anticoagulation management service. The patient reported the following INR results to our service for anticoagulation monitoring and adjustment. INR 2.1 is  therapetuic for this patient (goal range 2-3) and is reflective of 7 mg TWD. No questions or problems submitted. Pt to continue 7 mg TWD and recheck INR in 7 days per Rad[facility] protocol. Patient's  Alexander Uribe called main pharmacy evening of 8/9/18. INR had not resulted during clinic hours, but patient's wife was concerned regarding his INR. Called patient's wife with INR result of 2.1. Wife explained to me that patient was discharged from the hospital last Wednesday, 8/1/18, and had been taking warfarin 1mg (one half of his 2mg tablets) daily. Instructed patient to continue with this dose (as above).      [place], PharmD   8/10/2018 8:39 AM

## 2018-08-17 PROBLEM — E87.1 HYPONATREMIA WITH EXCESS EXTRACELLULAR FLUID VOLUME: Status: ACTIVE | Noted: 2018-01-01

## 2018-08-17 PROBLEM — E87.1 CHRONIC HYPONATREMIA: Status: ACTIVE | Noted: 2018-01-01

## 2018-08-17 NOTE — Clinical Note
Patient Class: Inpatient [101]   REQUIRED: Diagnosis: Hyponatremia with excess extracellular fluid volume [761559]   Estimated Length of Stay: Estimated stay of more than 2 midnights   Future Attending Provider: Sam Vee [[id number]]   Admitting Provider: Sam Vee [[id number]]   Telemetry Bed Required?: Yes

## 2018-08-17 NOTE — ED NOTES
Spoke with Velma Mchugh in blood bank to make her aware that the patient is not to receive plasma until tomorrow. Plasma is good for 5 days.       Shaista Orozco RN  08/17/18 [id number]

## 2018-08-17 NOTE — ED TRIAGE NOTES
Pt brought from special procedures to ER for c/o increased shortness of breath moses. on exertion, productive cough of green phlegm & wheezing, pt was scheduled for a paracentesis today but his INR was 2.3, he's held his Coumadin for a few days, he's had a 25 pound weight gain in the past 3 weeks since his last paracentesis, coloring is jaundice, ls w/ exp wheezing, pt a &o x4 male in w/c, wife accompanying patient.

## 2018-08-17 NOTE — ED NOTES
Patient continues sitting up in chair at this time. Patient asking for food. Patient given a turkey wrap, chips, and pudding at this time. Patient in no distress.  Wife remains at bedside      Lorraine Zhou RN  08/17/18 . Brady Duran RN  08/17/18 [id number]

## 2018-08-17 NOTE — ED PROVIDER NOTES
congestion, rhinorrhea and sore throat. Eyes: Negative for photophobia and pain. Respiratory: Negative for cough and shortness of breath. Cardiovascular: Negative for chest pain and palpitations. Gastrointestinal: Positive for abdominal distention. Negative for abdominal pain, diarrhea, nausea and vomiting. Genitourinary: Negative for dysuria and flank pain. Musculoskeletal: Negative for back pain. Skin: Negative for rash. Neurological: Negative for dizziness, light-headedness and headaches. Except as noted above the remainder of the review of systems was reviewed and negative.        PAST MEDICAL HISTORY     Past Medical History:   Diagnosis Date    [facility]     Diabetes mellitus ([facility]     Hx of cirrhosis     Hyperlipidemia     Hypertension      Past Surgical History:   Procedure Laterality Date    CORONARY ARTERY BYPASS GRAFT  2006    EYE SURGERY      HIP SURGERY Bilateral 1996    HI EGD TRANSORAL BIOPSY SINGLE/MULTIPLE N/A 4/25/2017    EGD BIOPSY performed by Latesha Tran DO at [address] [place] [address] EGD TRANSORAL BIOPSY SINGLE/MULTIPLE N/A 7/27/2018    EGD ESOPHAGOGASTRODUODENOSCOPY performed by Abelardo Cassidy MD at [facility]      Social History     Social History    Marital status:      Spouse name: N/A    Number of children: N/A    Years of education: N/A     Social History Main Topics    Smoking status: Former Smoker     Types: Cigars    Smokeless tobacco: Never Used    Alcohol use No    Drug use: No    Sexual activity: Not Asked     Other Topics Concern    None     Social History Narrative    Social/Functional History                SCREENINGS             PHYSICAL EXAM    (up to 7 for level 4, 8 or more for level 5)     ED Triage Vitals [08/17/18 1312]   BP Temp Temp Source Pulse Resp SpO2 Height Weight   119/74 97.9 °F (36.6 °C) Oral 94 18 98 % 5' 7\" (1.702 m) 228 lb (103.4 kg)       Physical Exam   Constitutional: He is Chloride 87 (*)     Glucose 192 (*)     BUN 45 (*)     Calcium 8.2 (*)     Total Protein 5.2 (*)     Alb 2.8 (*)     Total Bilirubin 6.3 (*)     All other components within normal limits   TYPE AND SCREEN   PREPARE FRESH FROZEN PLASMA       All other labs were within normal range or not returned as of this dictation. EMERGENCY DEPARTMENT COURSE and DIFFERENTIAL DIAGNOSIS/MDM:   Vitals:    Vitals:    08/17/18 1503 08/17/18 1604 08/17/18 1627 08/17/18 1635   BP: 134/72 113/69 113/69 112/71   Pulse:  100 95 84   Resp: 18 18 16 16   Temp:       TempSrc:       SpO2: 99% 96% 98% 100%   Weight:       Height:                MDM on exam the patient is nontoxic in no distress. His vital signs are normal.  He was sent over due to his INR still being high. Intervention radiology does not want to perform ultrasound-guided paracentesis until his INR is below 2. They're requesting the patient be admitted for observation to perform the paracentesis tomorrow at [address] [place] [address] requesting 2 units of FFP to be given prior to the procedure as well as vitamin K here in the emergency department. Chest x-ray illustrated atelectasis but no obvious infiltrate. His cough is not productive. He is not febrile and does not have a white count. I do not believe this represent a pneumonia. I believe this likely represents diaphragmatic irritation from the excess fluid in the abdomen. Symptoms would likely improve. Since he says were performed. Case was discussed with both Dr. Nidhi Ponce and Dr. Ann Cruz. Patient will be admitted to the hospital sinusitis of distress or decompensation. CRITICAL CARE TIME       CONSULTS:  IP CONSULT TO HOSPITALIST  IP CONSULT TO INTERVENTIONAL RADIOLOGY    PROCEDURES:  Unless otherwise noted below, none     Procedures    FINAL IMPRESSION      1. Chronic hyponatremia    2.  Bronchitis          DISPOSITION/PLAN   DISPOSITION Decision To Admit 08/17/2018 03:41:39 PM      PATIENT REFERRED TO:  No follow-up

## 2018-08-18 NOTE — FLOWSHEET NOTE
Pt to US for paracentesis. Electronically signed by Jing Michael.  Julien Keane on 8/18/2018 at 11:40 AM

## 2018-08-18 NOTE — H&P
8/18/18 H&P dictated cirrhosis NAFL auto anticoagulated COPD   Plan 2 units FFP  Vitamin K has not been affective d/t cirrhosis. ? Internal drainage of fluid ?  Awaiting GI opinion and Dr Lloyd Spence to do pericentesis Monday

## 2018-08-18 NOTE — CONSULTS
[address]                       [address], [address] [facility]                                   CONSULTATION    PATIENT NAME: Giovanna Huitron                  :        1945  MED REC NO:   [id number]                            ROOM:       W272  ACCOUNT NO:   [de-identified]                           ADMIT DATE: 2018  PROVIDER:     Kameron Marquez MD    CONSULT DATE:  2018    REASON FOR CONSULTATION:   Ascites. HISTORY OF PRESENT ILLNESS:  The patient is a 69-year-old male who has a  history of cirrhosis of the liver and complicated by ascites. The patient  had a large volume paracentesis during the previous admission which was  about almost a month ago and he went home on diuretics. He was also had  hyponatremia requiring hypertonic saline treatment. The patient came back  because of increasing ascites. No history of any hematemesis or melena. He has some mild leg swelling. PAST MEDICAL HISTORY:  Includes history of cirrhosis and also history of  atrial fibrillation, heart failure, diabetes, hypertension and  hyperlipidemia. SOCIAL HISTORY:  He does not smoke, does not drink any alcohol. REVIEW OF SYSTEMS:  Nothing more to add. PHYSICAL EXAMINATION:  GENERAL:  This is a 69-year-old male who appears slightly cachectic. HEENT:  No scleral icterus. Few spider angiomas. HEART:  S1 and S2 regular. ABDOMEN:  Soft, slightly distended. The patient had a large volume  paracentesis earlier today. EXTREMITIES:  Show mild edema and hyperpigmentation. NEUROLOGIC:  He is alert and oriented x3. No asterixis. Lab shows a sodium of 125, potassium 5.2, chloride is 88, CO2 of 26, BUN is  48, creatinine is 1.03, bilirubin is 6.7. AST and ALT are 13 and 28, alk  phos is 75. Albumin is 3.2, globulin is 2.1. White count is 5.1,  hemoglobin and hematocrit are 9 and 26.4, platelet count is 134,520.    ProTime, I do not see it in the lab

## 2018-08-19 NOTE — CARE COORDINATION
Agnes 45 Transitions Initial Follow Up Call    Call within 2 business days of discharge: Yes    Patient: Dylon Neil Patient : 1945   MRN: [id number]  Reason for Admission: There are no discharge diagnoses documented for the most recent discharge. Discharge Date: 18 RARS: Readmission Risk Score: 0     Spoke with: [address]: [place]    Non-face-to-face services provided:  Obtained and reviewed discharge summary and/or continuity of care documents  Assessment and support for treatment adherence and medication management-completed 1111f    Care Transitions 24 Hour Call    Do you have any ongoing symptoms?:  No  Do you have a copy of your discharge instructions?:  Yes  Do you have all of your prescriptions and are they filled?:  Yes  Have you been contacted by a [address]?:  No  Have you scheduled your follow up appointment?:  Yes  How are you going to get to your appointment?:  Car - family or friend to transport  Were you discharged with any Home Care or Post Acute Services:  No  Post Acute Services:  Home Health (Comment: [facility])  Patient DME:  Jaylyn Rodriguezmallika callahan  Do you have support at home?:  Partner/Spouse/SO  Do you feel like you have everything you need to keep you well at home?:  Yes  Are you an active caregiver in your home?:  No  Care Transitions Interventions       CTC spoke to Jennifer Alex for initial transitions call. Stated he is feeling much better since discharge on . Stated he had 20 lbs of fluid removed in hospital. Stated prior to IP stay he could not get his pants on but can today. Stated he slept well and his breathing is much better. Stated he still has occasional cough but improving. Pt said he is canceling GI appt  On  because he has new GI specialist. Stated PCP visited him in hospital and he has f/u  on 18. Stated he has been off Coumadin for a week but has appt with [facility] tomorrow.  Advised him per discharge instructions he is to

## 2018-08-20 NOTE — TELEPHONE ENCOUNTER
Patients wife called stating the following:   Patient off warfarin 8/13/18-8/17/18 for scheduled paracentesis - however per wife, patient's INR was 2.5 on 8/17/18 (per Epic, states 32.3). As such, patient received 2 units FFP and Vitamin K 5mg PO X 1 dose. Procedure was rescheduled for 8/18/17. Post-procedure, Dr. Ksenia Nguyen instructed patient to take warfarin 0.5mg Saturday, 8/18/18 and Sunday, 8/19/18, then contact [facility] for further dosing instructions. Previously, patient was on warfarin 7mg TWD (1mg daily), per patient's wife. Per [employer] sheet faxed, patient was on warfarin 6mg TWD (1mg daily, except 0.5mg Sunday, Monday). Updated dosing as described above in anti-coag tracker. At this time, instructed patient's wife to take warfarin 1mg today, 8/20/18, warfarin 0.5mg tomorrow, 8/21/18, and warfarin 1mg Wednesday, 8/22/18. POC INR to be drawn as previously ordered on 8/23/18 via [employer].  Alexander Look to be in to see patient/check patient's vitals at home today.        [place], PharmD   8/20/2018 10:56 AM

## 2018-08-23 NOTE — CONSULTS
Inpatient consult to IR  Consult performed by: Kingsley Romero ordered by: Claudia Vázquez  Reason for consult: Ascites        Satinder Edwards  [birth year]  [id number]  [facility]    Date of Consultation:  8/23/2018    Consultant: Allyn Wallace MD     PCP:  Andressa Pacheco DO         History of Present Illness: Patient is a 78-year-old gentleman with chronic recurring ascites. He recently had a liver biopsy, and paracentesis. Paracentesis demonstrated atypical cells, concerning for malignancy. The liver biopsy demonstrated inflammation without any evidence of malignant cells. Though patient didn't present at this time for ascites, though INR was 2.3, despite stopping Coumadin. He is feeling very uncomfortable and short of breath due to abdominal distention. Patient was admitted to correct INR with fresh frozen plasma so that a paracentesis can be performed. He is feeling very distended, and uncomfortable. He understands that he will be seeing an oncologist to evaluate for the source of the atypical cells seen in the ascites fluid previously. Past Medical History:   has a past medical history of Atrial fibrillation ([address] Diabetes mellitus ([address] Hx of cirrhosis; Hyperlipidemia; and Hypertension. Past Surgical History:   has a past surgical history that includes pr egd transoral biopsy single/multiple (N/A, 4/25/2017); Coronary artery bypass graft (2006); hip surgery (Bilateral, 1996); shoulder surgery (Left); eye surgery; and pr egd transoral biopsy single/multiple (N/A, 7/27/2018). Allergies:  Niacin    Home Medications:   Prior to Admission medications    Medication Sig Start Date End Date Taking? Authorizing Provider   warfarin (COUMADIN) 1 MG tablet Take as directed by [facility] [place] [facility]ticoagulation Management Service. Quantity equals 90 day supply.   Patient taking differently: Take 1 mg by mouth Take as directed by [facility] [place]

## 2018-08-23 NOTE — H&P
[address]                       [address], [address] [facility]                               HISTORY AND PHYSICAL    PATIENT NAME: Adrian Bernstein                  :        1945  MED REC NO:   [id number]                            ROOM:       W188  ACCOUNT NO:   [de-identified]                           ADMIT DATE: 2018  PROVIDER:     Lola Lynn DO    Corrected copy for correction of CSN and DOA - 2018/taiwo    HISTORY OF PRESENT ILLNESS:  This is a 18-year-old  male with  slight jaundice and distended abdomen admitted to the hospital.  The  patient was intending to have a paracentesis by Dr. Faby Parnell. It was felt the  patient's INR was too elevated and the procedure was canceled. The patient  was admitted to the hospital for observation to correct his INR. The  patient is on auto anticoagulation due to his liver disease. He was  admitted to the hospital he fresh-frozen plasma and vitamin K despite the  latter not having much of an effect from previous changes in his INR. The  patient is on Coumadin because of primary heart disease and atrial  fibrillation. He has chronic hyponatremia as a result of cirrhosis. He  has obvious ascites and has difficulty with ambulating and shortness of  breath with activity. He has been having productive phlegm with some  slight wheezing according to the wife. ALLERGIES TO MEDICATIONS:  NIACIN. HABITS:  Quit smoking years ago. No alcohol use. No opioids or  nonsteroidals. PAST SURGICAL HISTORY:  Coronary artery bypass grafting, upper endoscopy,  bilateral total hip replacements, left shoulder replacement, cataract  surgery. REVIEW OF SYSTEM:  Unremarkable. FAMILY HISTORY:  Obtained from the chart.     MEDICATIONS AT THE TIME OF HIS ADMISSION TO THE HOSPITAL:  To the hospital,  Tradjenta, Bumex, Flomax, albuterol, Aldactone, B12, Coumadin 1 mg daily  past medical history, COPD, diabetes,

## 2018-09-05 NOTE — PROGRESS NOTES
Pt to CT Hr via cart. IV inserted for albumin infusion. 1330 pt tolerating albumin well. 1340 albumin complete. Vitals stable. IV removed. 1345 D/c instructions given, verbalize understanding. 1350 pt wheeled to car with wife, assisted into car. Denies complaints. No changes noted at this time.

## 2018-09-06 NOTE — PROGRESS NOTES
Spoke to the wife Linda Alexander . The patient had a paracentesis done yesterday. Wife stated that he is having slight oozing at the site still. She has changed the dressing once. The fluid is clear yellow. Encouraged her to have the patient lay on his right side when lying down to allow any fluid still remaining to shift to the right and therefore allow that paracentesis puncture site to seal. She states it is very slight drainage. She stated that he is very weak today as well. She has a way to check his blood pressure, pulse, Oxygen saturation, and blood sugar. Encouraged her to do that and to call home health nurse if needed or Dr. Sanchez Escort office. She stated that his appetite is okay. The home health nurse is coming out this afternoon. Currently patient was resting in a chair.

## 2018-09-11 NOTE — H&P
Anticoagulation Management Service. Quantity equals 90 day supply. (Patient taking differently: Take 1 mg by mouth Take as directed by [facility] EM[facility]GENC[facility] [place] [facility]ticoagulation Management [facility]. Quantity equals 90 day supply. On wednesday) 60 tablet 1    vitamin B-12 (CYANOCOBALAMIN) 1000 MCG tablet Take 1,000 mcg by mouth daily      spironolactone (ALDACTONE) 25 MG tablet Take 25 mg by mouth daily  2    albuterol sulfate HFA (PROAIR HFA) 108 (90 Base) MCG/ACT inhaler Inhale 2 puffs into the lungs as needed      tamsulosin (FLOMAX) 0.4 MG capsule Take 1 capsule by mouth daily 60 capsule 5    nitroGLYCERIN (NITROSTAT) 0.4 MG SL tablet Place 0.4 mg under the tongue every 5 minutes as needed       linagliptin (TRADJENTA) 5 MG tablet Take 5 mg by mouth daily       bumetanide (BUMEX) 0.5 MG tablet Take 1 mg by mouth 2 times daily        No current facility-administered medications on file prior to encounter. Review of Systems   Constitutional: Negative for chills, diaphoresis, fever, malaise/fatigue and weight loss. HENT: Negative. Negative for congestion, ear pain, hearing loss and tinnitus. Eyes: Negative. Negative for blurred vision, double vision and photophobia. Respiratory: Negative. Negative for cough, hemoptysis, sputum production, shortness of breath and wheezing. Cardiovascular: Negative. Negative for chest pain, palpitations, claudication and leg swelling. Gastrointestinal: Positive for abdominal pain. Negative for constipation, diarrhea, heartburn, nausea and vomiting. Genitourinary: Negative. Negative for dysuria, flank pain, frequency, hematuria and urgency. Musculoskeletal: Negative. Negative for back pain, joint pain and neck pain. Skin: Negative. Negative for itching and rash. Neurological: Positive for weakness. Negative for dizziness, tingling, tremors, sensory change and headaches. Endo/Heme/Allergies: Negative. Negative for environmental allergies.  Does not

## 2018-09-11 NOTE — DISCHARGE SUMMARY
Discharge Summary    Patient Identification:  Patient: Sveta Dominique  : 1945  MRN: [id number]   Account: [de-identified]     Admit date: 2018  Discharge date: 2018   Attending provider: No att. providers found        Primary care provider: Geovany Edouard DO     History of Present Illness: anemia and weakness known ETON cirrhosis    Admission Diagnoses:  Ascites cirrhosis anemia no GIB Diabetic COPD OHDx BPH obesity     Discharge Diagnoses: Active Hospital Problems    Diagnosis Date Noted    Anemia [D64.9] 2018       Procedures: liver biopsy    Consults:   IP CONSULT TO GI  IP CONSULT TO INTERVENTIONAL RADIOLOGY  IP CONSULT TO GI  IP CONSULT TO REHAB/TCU ADMISSION COORDINATOR  IP CONSULT TO HOME CARE NEEDS     Examination:  Physical Exam On Chart      Significant Diagnostics:   Labs: No results found for this or any previous visit (from the past 67 hour(s)). Radiology: No results found. Hospital Course:   Sveta Dominique is a 74 y.o. male admitted to [facility] on 2018 for anemia and weakness. INR prolonged on admission d/t coumadinand auto anticogaulation from cirrhosis. Mass in liver needed FFP to correct INR before biopsy by Snajay. Discharged stable with plans to follow with Dr Joao Molina and myself Ascites present no major respiratory issue. Pericentesis to be done out-patient        Assessment:  Active Hospital Problems    Diagnosis Date Noted    Anemia [D64.9] 2018         Plan:   Medications:  Discharge Medication List as of 2018 11:31 AM      CONTINUE these medications which have NOT CHANGED    Details   warfarin (COUMADIN) 1 MG tablet Take as directed by [facility] EMERGENCY MEDICAL [place] AT [place] [facility]. Quantity equals 90 day supply. , Disp-60 tablet, R-1Normal      vitamin B-12 (CYANOCOBALAMIN) 1000 MCG tablet Take 1,000 mcg by mouth dailyHistorical Med      spironolactone (ALDACTONE) 25 MG tablet Take 25 mg by mouth daily, R-2Historical Med      albuterol sulfate HFA (PROAIR HFA) 108 (90 Base) MCG/ACT inhaler Inhale 2 puffs into the lungs as neededHistorical Med      tamsulosin (FLOMAX) 0.4 MG capsule Take 1 capsule by mouth daily, Disp-60 capsule, R-5      nitroGLYCERIN (NITROSTAT) 0.4 MG SL tablet Place 0.4 mg under the tongue every 5 minutes as needed Historical Med      linagliptin (TRADJENTA) 5 MG tablet Take 5 mg by mouth daily Historical Med      bumetanide (BUMEX) 0.5 MG tablet Take 1 mg by mouth 2 times daily Historical Med         STOP taking these medications       chlorthalidone (HYGROTON) 25 MG tablet Comments:   Reason for Stopping:         simvastatin (ZOCOR) 40 MG tablet Comments:   Reason for Stopping:         glipiZIDE (GLUCOTROL) 5 MG tablet Comments:   Reason for Stopping:         amLODIPine (NORVASC) 5 MG tablet Comments:   Reason for Stopping:         nebivolol (BYSTOLIC) 5 MG tablet Comments:   Reason for Stopping:         aspirin 81 MG chewable tablet Comments:   Reason for Stopping:              Follow-up visits: Lexy Corona DO  [address] [place] [address]  [address]  [place] [id number]    On 8/21/2018  at 9:30am    Olga Esposito MD  [address]  [phone]              Electronically signed by Lexy Corona DO on 9/11/2018 at 10:57 AM

## 2018-09-14 PROBLEM — N19 RENAL FAILURE: Status: ACTIVE | Noted: 2018-01-01

## 2018-09-14 NOTE — ED PROVIDER NOTES
[facility] ICU  eMERGENCY dEPARTMENT eNCOUnter      Pt Name: Hermelindo Carrizales  MRN: [id number]  [place] 1945  Date of evaluation: 9/14/2018  Provider: Cecily Collins PA-C    CHIEF COMPLAINT       Chief Complaint   Patient presents with    Other     ascites, pt receives paracentesis every tues, increased build up currently          HISTORY OF PRESENT ILLNESS  (Location/Symptom, Timing/Onset, Context/Setting, Quality, Duration, Modifying Factors, Severity.)   Hermelindo Carrizales is a 74 y.o. male who presents to the emergency department c/o SOB, abdominal distention and lower extremity weakness. Patient has a PMH significant for cirrhosis with ascites requiring paracentesis q2.5 weeks since about June of this year, DM II, HLD, atrial fibrillation which was managed with coumadin until 8/30/18. Reports last appointment for paracentesis was 9/5/18, scheduled for next appointment on Tuesday, 9/18/18. Wife reported concern over confusion that began a day ago, the increasing abdominal distention, jaundice, SOB, and lower extremity weakness that began this evening. Patient normally can get around with a walker but was not able to support his weight with his walker tonight. Denies HA, dizziness, chest pain, palpitations, cough, wheezing, abdominal pain, nausea, vomiting or diarrhea. Wife states that he has a chronic tremor with movement, and has been hypotensive lately. HPI    Nursing Notes were reviewed and I agree. REVIEW OF SYSTEMS    (2-9 systems for level 4, 10 or more for level 5)     Review of Systems   Eyes: Positive for discharge (right eye). Respiratory: Positive for shortness of breath. Negative for cough, chest tightness and wheezing. Cardiovascular: Positive for leg swelling. Negative for chest pain and palpitations. Gastrointestinal: Positive for abdominal distention. Negative for abdominal pain, nausea and vomiting. Genitourinary: Positive for difficulty urinating.  Negative for frequency and hematuria. Musculoskeletal: Positive for gait problem (BLE weakness). Skin: Positive for color change (yellow) and wound (Left forearm). Neurological: Positive for tremors, weakness and light-headedness. Negative for dizziness and headaches. Hematological: Bruises/bleeds easily (d/c coumadin 8/30/18). Psychiatric/Behavioral: Positive for confusion. Except as noted above the remainder of the review of systems was reviewed and negative. PAST MEDICAL HISTORY     Past Medical History:   Diagnosis Date    [facility] [address]     Diabetes mellitus ([facility] [address] [facility]     Hx of cirrhosis     Hyperlipidemia     Hypertension          SURGICAL HISTORY       Past Surgical History:   Procedure Laterality Date    CORONARY ARTERY BYPASS GRAFT  2006    EYE SURGERY      HIP SURGERY Bilateral 1996    [place] EGD TRANSORAL BIOPSY SINGLE/MULTIPLE N/A 4/25/2017    EGD BIOPSY performed by Alvaro Guzman DO at [facility] EGD TRANSORAL BIOPSY SINGLE/MULTIPLE N/A 7/27/2018    EGD ESOPHAGOGASTRODUODENOSCOPY performed by Gaetano Rivas MD at [address]       Discharge Medication List as of 9/15/2018  8:01 PM      CONTINUE these medications which have NOT CHANGED    Details   vitamin B-12 (CYANOCOBALAMIN) 1000 MCG tablet Take 1,000 mcg by mouth dailyHistorical Med      spironolactone (ALDACTONE) 25 MG tablet Take 25 mg by mouth daily, R-2Historical Med      albuterol sulfate HFA (PROAIR HFA) 108 (90 Base) MCG/ACT inhaler Inhale 2 puffs into the lungs as neededHistorical Med      tamsulosin (FLOMAX) 0.4 MG capsule Take 1 capsule by mouth daily, Disp-60 capsule, R-5      linagliptin (TRADJENTA) 5 MG tablet Take 5 mg by mouth daily Historical Med      bumetanide (BUMEX) 0.5 MG tablet Take 1 mg by mouth 2 times daily Historical Med      warfarin (COUMADIN) 1 MG tablet Take as directed by [facility] EMERGENCY [facility] AT [place] [facility]. Quantity equals 90 day supply. , Disp-60 tablet, R-1Normal      nitroGLYCERIN (NITROSTAT) 0.4 MG SL tablet Place 0.4 mg under the tongue every 5 minutes as needed Historical Med             ALLERGIES     Niacin    FAMILY HISTORY       Family History   Problem Relation Age of Onset    Hypertension Mother     Diabetes Mother     Cancer Sister         lung    Heart Disease Father           SOCIAL HISTORY       Social History     Social History    Marital status:      Spouse name: N/A    Number of children: N/A    Years of education: N/A     Social History Main Topics    Smoking status: Former Smoker     Types: Cigars    Smokeless tobacco: Never Used    Alcohol use No    Drug use: No    Sexual activity: Not Asked     Other Topics Concern    None     Social History Narrative    Social/Functional History                SCREENINGS    [place] Coma Scale  Eye Opening: To speech  Best Verbal Response: Oriented (oriented to self and place only)  Best Motor Response: Obeys commands  [place] Coma Scale Score: 14      PHYSICAL EXAM    (up to 7 for level 4, 8 or more for level 5)     ED Triage Vitals [09/14/18 0057]   BP Temp Temp Source Pulse Resp SpO2 Height Weight   (!) 89/51 97.5 °F (36.4 °C) Oral 70 15 97 % 5' 7\" (1.702 m) 199 lb 8 oz (90.5 kg)       Physical Exam   Constitutional: He is cooperative. He appears ill. HENT:   Head: Normocephalic and atraumatic. Mouth/Throat: Uvula is midline, oropharynx is clear and moist and mucous membranes are normal.   Eyes: EOM are normal. Right eye exhibits discharge. Right conjunctiva is injected. Left conjunctiva is injected. Scleral icterus is present. Cardiovascular: Normal rate, normal heart sounds, intact distal pulses and normal pulses. An irregular rhythm present. Pulmonary/Chest: Effort normal and breath sounds normal.   Abdominal: Bowel sounds are normal. He exhibits distension and ascites. There is no tenderness.    Musculoskeletal:   Generalized decreased ROM of BLE, can wiggle ORDERED BY: Kaiden Machuca  SOURCE: Blood Blood                        COLLECTED:  09/14/18 12:49  ANTIBIOTICS AT DANA.:                      RECEIVED :  09/14/18 12:56   CULTURE BLOOD #2    Narrative:     ORDER#: [id number]                          ORDERED BY: Kaiden Machuca  SOURCE: Blood                              COLLECTED:  09/14/18 12:49  ANTIBIOTICS AT DANA.:                      RECEIVED :  09/14/18 12:56   URINE CULTURE    Narrative:     ORDER#: [id number]                          ORDERED BY: Kaiden Sven  SOURCE: Urine Straight Cath Urine          COLLECTED:  09/14/18 21:07  ANTIBIOTICS AT DANA.:                      RECEIVED :  09/14/18 21:07   ETHANOL   URINE DRUG SCREEN   MAGNESIUM    Narrative:     CALL  [facility]  [facility] tel. [phone],  Sodium, Potassium, BUN results called to and read back by Ayush Geiger,  09/14/2018 02:11, by [facility]   POCT GLUCOSE   POCT GLUCOSE   POCT GLUCOSE   POCT GLUCOSE   POCT GLUCOSE   POCT GLUCOSE   POCT GLUCOSE   POCT GLUCOSE   POCT GLUCOSE   PREPARE FRESH FROZEN PLASMA       All other labs were within normal range or not returned as of this dictation. EMERGENCY DEPARTMENT COURSE and DIFFERENTIAL DIAGNOSIS/MDM:   Vitals:    Vitals:    09/15/18 1700 09/15/18 1800 09/15/18 1900 09/15/18 2000   BP: (!) 80/37 (!) 80/37 (!) 114/47 (!) 112/58   Pulse: 82 86 84 86   Resp: 13 15 15 13   Temp:    97.9 °F (36.6 °C)   TempSrc:    Oral   SpO2: 99% 99% 100% 99%   Weight:       Height:           REASSESSMENT      Patient was signed out to William Mccann at 0200. MDM  Number of Diagnoses or Management Options  Acute renal failure, unspecified acute renal failure type [facility]):   General weakness:   Hepatic encephalopathy ([address]): Hyperkalemia:   Hyponatremia:   Other ascites:   Diagnosis management comments: Discussed patient with Dr. Keagan Parker and Dr. Anatoliy Blankenship. per Dr. Anatoliy Blankenship he is familiar with this patient.  patient has what appears to be acute renal failure at

## 2018-09-14 NOTE — ED NOTES
Lab called with critical results - , Na 116, K+ 7.2 provider notifed     Jolly Reynoso RN  09/14/18 0341

## 2018-09-14 NOTE — TELEPHONE ENCOUNTER
Pt taken off warfarin while inpt at [facility] as paracentesis needed-check to see if pt will be resuming on discharge?

## 2018-09-14 NOTE — ED NOTES
Wife reports that pt does not urinate much as he is on PO fluid restriction and today pt only had output of approx 100 ml. SUZANNA Orozco aware.  We will give 500 ml fluids and see if pt can urinate for sample (per Larry Orozco), hold off on straight cath      Carl Mariano, RN  09/14/18 [address] [place], [place]  09/14/18 8094

## 2018-09-14 NOTE — H&P
[address]                       [address], [address] [facility]                               HISTORY AND PHYSICAL    PATIENT NAME: Chapito Gonzales                  :        1945  MED REC NO:   [id number]                            ROOM:       IC11  ACCOUNT NO:   [de-identified]                           ADMIT DATE: 2018  PROVIDER:     Rex Gomez DO    HISTORY OF PRESENT ILLNESS:  This is a 68-year-old who was admitted to  hospital through the emergency room with extreme weakness while at home. The patient has a known history of alcoholic cirrhosis and severe ascites. The ascites has been drained in the past by Dr. Quincy Stacy, no evidence of  malignancy. Results have shown a transudate from cirrhosis. The patient  does have a history of underlying organic heart disease and he has had  bypass surgery. He has known diabetic, has known COPD. On admission to  the hospital, the patient with hyponatremic with hyperkalemia. Presently,  he is alert with family members present, but uncertain of his answers upon  questioning. He appears to be in no distress. Blood pressure is stable. He denies any abdominal pain, shortness of breath. ALLERGIES TO MEDICATIONS:  NIACIN. HABITS:  Quit smoking years ago. No alcohol use recently. No  nonsteroidals or opioids. PAST SURGICAL HISTORY:  Coronary artery bypass grafting, bilateral hip  surgery, left shoulder surgery, cataract surgery, endoscopies, and  paracentesis in the past.    MEDICATIONS AT THE TIME OF HIS ADMISSION:  Aldactone, B12, albuterol,  Flomax, Tradjenta, Coumadin, which was stopped, told the family 1 week ago. REVIEW OF SYSTEMS:  Unable to be performed. FAMILY HISTORY:  Unremarkable. PHYSICAL EXAMINATION:  VITAL SIGNS:  5 feet 7 inches, 223 pounds. Blood pressure 90/50, heart  rate 80, respirations 14 and afebrile. HEENT:  Normocephalic. Sclerae is icterus.   There is matting of the  eyelids. NECK:  Supple. No JVD or adenopathy  HEART:  Regular with 1/6 systolic murmur. Midline chest wall scar is  present. ABDOMEN:  Distended. No tenderness. No guarding or rigidity. Decreased  bowel sounds. EXTREMITIES:  Lower limbs showed 1+ edema of the ankles to the knees  bilaterally. SKIN: _____ changes in the skin are noted. IMPRESSION:  1. End-stage liver disease with ascites. 2.  Hypotension. 3.  Hyperkalemia. 4.  Hyponatremia. 5.  Organic heart disease. 6.  Hypoalbuminemia. 7.  Anemia. 8.  History of DJD. 9.  Diabetes mellitus type 2. PLAN:  The patient has been given albumin IV. Dr. Camden Rubin to evaluate for  possible paracentesis or wait till the patient is more stable _____  decision. Vaprisol to be given with Enriquez catheter inserted to ensure good  urine outputs. Albumin is needed. Watch for signs of spontaneous  peritonitis. Blood sugars q.6h. Tobradex to the eyes. Follow laboratory  data including ammonia level for need of lactulose and possibly _____.         Carlin Severance, DO    D: 09/14/2018 13:47:07       T: 09/14/2018 13:50:00     [id number]  Job#: [id number]     Doc#: [id number]    CC:

## 2018-09-14 NOTE — CONSULTS
Pulmonary and Critical Care Medicine  Consult Note  Encounter Date: 2018 12:18 PM    Mr. Michelle Thompson is a 74 y.o. male  : 1945  Requesting Provider: Meaghan Gibbs DO    Reason for request: ICU management               HISTORY OF PRESENT ILLNESS:    Patient is 74 y.o. presents with weakness and feeling unwell for the last few days, yesterday became confused, with increased abdominal distention, he also reported shortness of breath, no chest pain, no fever, he has some nausea today after Kayexalate dose otherwise no nausea or vomiting. He is currently laying in bed reports mild shortness of breath no cough, no fever, and denies chest pain or abdominal pain. On arrival to ED he was found to be hyperkalemic, with acute kidney injury, and hypotensive. He is admitted to ICU. Past Medical History:        Diagnosis Date    Atrial fibrillation ([address]     Diabetes mellitus ([facility] [address] [facility]     Hx of cirrhosis     Hyperlipidemia     Hypertension        Past Surgical History:        Procedure Laterality Date    CORONARY ARTERY BYPASS GRAFT  2006    EYE SURGERY      HIP SURGERY Bilateral 1996    [place] EGD TRANSORAL BIOPSY SINGLE/MULTIPLE N/A 2017    EGD BIOPSY performed by Meaghan Gibbs DO at [address] [facility] EGD TRANSORAL BIOPSY SINGLE/MULTIPLE N/A 2018    EGD ESOPHAGOGASTRODUODENOSCOPY performed by Sunitha Brody MD at [facility]        Social History:     reports that he has quit smoking. His smoking use included Cigars. He has never used smokeless tobacco. He reports that he does not drink alcohol or use drugs.     Family History:   Family History   Problem Relation Age of Onset    Hypertension Mother     Diabetes Mother    [employer] Cancer Sister         lung    Heart Disease Father        Allergies:  Niacin        MEDICATIONS during current hospitalization:    Continuous Infusions:   sodium chloride         Scheduled Meds:   sodium chloride flush  10 mL Intravenous 2 times per day    [START ON 9/15/2018] phytonadione  5 mg Oral Daily    lidocaine  5 mL Intradermal Once    sodium chloride flush  10 mL Intravenous 2 times per day       PRN Meds:sodium chloride flush, acetaminophen, sodium chloride flush        REVIEW OF SYSTEMS:  ROS: 10 organs review of system is done including general, psychological, ENT, hematological, endocrine, respiratory, cardiovascular, gastrointestinal, musculoskeletal, neurological,  allergy and Immunology is done and is otherwise negative. PHYSICAL EXAM:    Vitals:  BP (!) 93/31   Pulse 80   Temp 97.6 °F (36.4 °C) (Axillary)   Resp 13   Ht 5' 7\" (1.702 m)   Wt 223 lb 5.2 oz (101.3 kg)   SpO2 99%   BMI 34.98 kg/m²     General: alert, mild distress, icteric, weak  Head: normocephalic, atraumatic  Eyes:No gross abnormalities. , PERRL and Sclera-  icteric   ENT:  MMM no lesions  Neck:  supple and no masses  Chest : Poor air movement, with bilateral basal rales, no wheezing, nontender, tympanic  Heart[de-identified] Heart sounds are normal.  Regular rate and rhythm without murmur, gallop or rub. ABD:  symmetric, liver span normal to percussion, soft, non-tender, spleen non-palpable  Musculoskeletal : no cyanosis, no clubbing and 1+  edema  Neuro:  Awake and alert, and moves all 4 extremities. Skin: No rashes or nodules noted. Skin is icteric  Lymph node:  no cervical nodes  Urology: No Enriquez   Psychiatric: appropriate        Data Review  Recent Labs      09/14/18   0115   WBC  6.0   HGB  9.9*   HCT  29.7*   PLT  41*      Recent Labs      09/14/18   0115  09/14/18   0128  09/14/18   0350  09/14/18   0941   NA  116*   --    --   115*   K  7.2*   --    --   6.6*   CL  81*   --    --   82*   CO2  19*   --    --   15*   BUN  102*   --    --   107*   CREATININE  2.69*  3.2*   --   2.35*   GLUCOSE  101   --   100  84       MV Settings: ABGs: No results for input(s): PHART, WOQ6LNK, PO2ART, TDY5CZU, BEART, M7FYSHCU, RCE9WDO in the last 72 hours.   O2

## 2018-09-15 NOTE — PROGRESS NOTES
0730- Shift assessment completed. Afebrile. Pt is alert, but oriented to self & place, but disoriented to time. Abdomen is distended, non-tender, BS hypoactive x4. Trace generalized edema noted. Critical labs called this am. Cr, K, and ammonia on the rise. Lungs dim with expiratory wheezes present.    Cr 2.56  Ammonia 128    0830- [place] paged to make aware of am labs. 0930- Pt wife, Ivone Gamez, at bedside. She has a couple questions for Dr. Wyatt Perez, and I let her know he has not rounded yet. She would like to discuss changing his code status to [address], because that's what his wishes are. She also stated that we should have copy of this for his chart she has at home, but didn't bring. I requested her bring copy and I will call medical records. 0- Dr. Wyatt Perez paged again r/t morning labs. No return call as of yet.

## 2018-09-15 NOTE — PROGRESS NOTES
Met with family to review hospice services. Consents signed for care. Plan is to transfer pt to in-pt hospice center at 2030.

## 2018-09-15 NOTE — PROGRESS NOTES
Hospice RN, Jeff Chapa, here meeting with pt's wife and 3 of their adult children in conference room.

## 2018-09-16 NOTE — PROGRESS NOTES
1955 Call placed to Dr. Matthew Zhang answering service RE: code status as pt is transferring to Hospice.

## 2018-09-19 LAB
BLOOD CULTURE, ROUTINE: NORMAL
CULTURE, BLOOD 2: NORMAL

## 2019-07-30 NOTE — CONSULTS
Consult dictated. Patient remains on 200 of Esmolol IV and 5mg of Nicardipine IV with SBP in the 120's and HR of 70. Metoprolol was increased from 50mg to 100mg every 8 hours with no change in SBP or heart rate. Metoprolol has been discontinued and a dose of Correg will be given po at 1930. Patient is still on strict bedrest, but would love to be able to get out of bed and really wants to go home. Renal has been following and dialysis is not needed at this time. Creat remains the same and potassium 5.0. Patient's wife at his bedside all day and both are aware of the current plan of care. Refer to flow sheets for further details.

## 2019-09-22 NOTE — PROGRESS NOTES
becausr of significant hyponitremia EGD was cancelled ,will have to wait till electrolytes are corrected. SI/psychiatric evaluation

## 2019-11-07 NOTE — PROGRESS NOTES
Mr. Uriel Belle is a 74 y.o. y/o male with history of Afib who presents today for anticoagulation monitoring and adjustment. INR 2.9 is therapeutic for this patient (goal range 2-3) and is reflective of 17.5 mg TWD  Patient verifies current dosing regimen, patient able to verbally recall dose  Pt just got out of hospital for kidney infection.   Patient reports 0  missed doses since last INR   Patient denies s/sx clotting and/or stroke  Patient denies hematuria, epistaxis, rectal bleeding  Patient denies changes in diet, alcohol, or tobacco use  Reviewed medication list and drug allergies with patient, updated any medication additions or modifications accordingly  Patient also denies any pending medical or dental procedures scheduled at this time  Patient was instructed to continue 17.5 mg TWD and RTC 6 weeks V-Y Flap Text: The defect edges were debeveled with a #15 scalpel blade.  Given the location of the defect, shape of the defect and the proximity to free margins a V-Y flap was deemed most appropriate.  Using a sterile surgical marker, an appropriate advancement flap was drawn incorporating the defect and placing the expected incisions within the relaxed skin tension lines where possible.    The area thus outlined was incised deep to adipose tissue with a #15 scalpel blade.  The skin margins were undermined to an appropriate distance in all directions utilizing iris scissors.

## (undated) DEVICE — TUBE SET 96 MM 64 MM H2O PERISTALTIC STD AUX CHANNEL

## (undated) DEVICE — LUBRICANT SURG JELLY ST BACTER TUBE 4.25OZ

## (undated) DEVICE — CONMED SCOPE SAVER BITE BLOCK, 20X27 MM: Brand: SCOPE SAVER

## (undated) DEVICE — ENDO CARRY-ON PROCEDURE KIT: Brand: ENDO CARRY-ON PROCEDURE KIT

## (undated) DEVICE — ADAPTER FLSH PMP FLD MGMT GI IRRIG OFP 2 DISPOSABLE

## (undated) DEVICE — GLOVE SURG SZ 85 STD WHT LTX SYN POLYMER BEAD REINF ANTI RL

## (undated) DEVICE — BRUSH CLEANING ENDOSCOPE CHAN DISP

## (undated) DEVICE — Device: Brand: ENDO SMARTCAP

## (undated) DEVICE — SONY PRINTER PAPER

## (undated) DEVICE — FORCEPS BX L240CM JAW DIA2.4MM ORNG L CAP W/ NDL DISP RAD